# Patient Record
Sex: FEMALE | Race: WHITE
[De-identification: names, ages, dates, MRNs, and addresses within clinical notes are randomized per-mention and may not be internally consistent; named-entity substitution may affect disease eponyms.]

---

## 2017-10-31 ENCOUNTER — HOSPITAL ENCOUNTER (EMERGENCY)
Dept: HOSPITAL 92 - ERS | Age: 82
Discharge: HOME | End: 2017-10-31
Payer: MEDICARE

## 2017-10-31 DIAGNOSIS — E03.9: ICD-10-CM

## 2017-10-31 DIAGNOSIS — I10: ICD-10-CM

## 2017-10-31 DIAGNOSIS — Z79.899: ICD-10-CM

## 2017-10-31 DIAGNOSIS — E78.5: ICD-10-CM

## 2017-10-31 DIAGNOSIS — G30.9: ICD-10-CM

## 2017-10-31 DIAGNOSIS — S61.402A: Primary | ICD-10-CM

## 2017-10-31 DIAGNOSIS — M19.90: ICD-10-CM

## 2017-10-31 DIAGNOSIS — D64.9: ICD-10-CM

## 2017-10-31 DIAGNOSIS — F32.9: ICD-10-CM

## 2017-10-31 DIAGNOSIS — S00.81XA: ICD-10-CM

## 2017-10-31 DIAGNOSIS — I48.91: ICD-10-CM

## 2017-10-31 DIAGNOSIS — M25.551: ICD-10-CM

## 2017-10-31 DIAGNOSIS — F41.9: ICD-10-CM
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
## 2017-10-31 DIAGNOSIS — I25.10: ICD-10-CM

## 2017-10-31 DIAGNOSIS — Z86.73: ICD-10-CM

## 2017-10-31 PROCEDURE — 70450 CT HEAD/BRAIN W/O DYE: CPT

## 2017-10-31 PROCEDURE — 72170 X-RAY EXAM OF PELVIS: CPT

## 2017-10-31 NOTE — RAD
TWO VIEWS RIGHT HIP:

 

Date: 10-31-17 

 

History: Right hip pain after falling out of bed. 

 

FINDINGS: 

There is osteoarthritis involving the right hip. No obvious fracture is appreciated. There is no efren
dence of a dislocation. Degenerative change is seen in the pubic symphysis and lower lumbar spine. V
ascular calcifications and phleboliths overlie the pelvis. Moderate amount of retained fecal materia
l is seen in the region of the rectum. 

 

IMPRESSION: 

Osteoarthritis right hip. No obvious fracture is seen. If there is high clinical concern for fractur
e, MRI would be the more sensitive study of choice for evaluation of the radiographically occult rig
ht hip fracture. 

 

POS: [facility]

## 2017-10-31 NOTE — RAD
PORTABLE AP PELVIS:

 

Date: 10-31-17 

 

History: Fall from bed this morning, abrasions over left thigh and patient complains of right hip pa
in. 

 

FINDINGS: 

No obvious fracture is apparent. Patient is rotated. Degenerative changes seen in the lower lumbar s
pine. Phleboliths overlie the pelvis. Mild degenerative changes seen in the pubic symphysis. 

 

IMPRESSION: 

No obvious fracture involving either hip. There is minimal osteoarthritis. 

 

POS: [facility]

## 2017-10-31 NOTE — CT
NONCONTRAST HEAD CT:

 

Date: 10-31-17 

 

History: Patient fell this morning and hit head. Patient has Alzheimer's disease. Patient also compl
ains of right leg pain. 

 

Comparison: 9-5-17

 

FINDINGS: 

Again noted is confluent decreased attenuation of the periventricular white matter likely related to
 chronic small vessel ischemic changes. Areas of encephalomalacia are again seen in the bilateral oc
cipital regions suggesting remote areas of infraction unchanged from prior study. No acute cortical 
infarction is seen. There is no intraparenchymal or extraaxial hemorrhage. Mild cerebral volume loss
 is again present. There is no mass effect or midline shift. Ventricular system is normal in size, s
hape, and position for the degree of sulcal atrophy. 

 

No calvarial fracture is seen. There has been no interval change when compared to the prior study. 

 

IMPRESSION: 

1. No acute intracranial abnormalities demonstrated. 

2. Severe chronic small vessel ischemic changes. Superimposed acute white matter ischemic event woul
d be difficult to entirely exclude given the degree of white matter ischemic change. 

3. Remote infarcts, bilateral parietooccipital lobes. 

4. Mild cerebral volume loss. 

 

POS: [facility]

## 2017-12-24 ENCOUNTER — HOSPITAL ENCOUNTER (INPATIENT)
Dept: HOSPITAL 92 - ERS | Age: 82
LOS: 19 days | Discharge: NURSING, REHAB OR LONG-TERM CARE | DRG: 70 | End: 2018-01-12
Attending: HOSPITALIST | Admitting: HOSPITALIST
Payer: MEDICARE

## 2017-12-24 VITALS — BODY MASS INDEX: 19.5 KG/M2

## 2017-12-24 DIAGNOSIS — R25.1: ICD-10-CM

## 2017-12-24 DIAGNOSIS — E78.5: ICD-10-CM

## 2017-12-24 DIAGNOSIS — F02.80: ICD-10-CM

## 2017-12-24 DIAGNOSIS — G30.9: ICD-10-CM

## 2017-12-24 DIAGNOSIS — I48.0: ICD-10-CM

## 2017-12-24 DIAGNOSIS — R62.7: ICD-10-CM

## 2017-12-24 DIAGNOSIS — Z90.710: ICD-10-CM

## 2017-12-24 DIAGNOSIS — E87.0: ICD-10-CM

## 2017-12-24 DIAGNOSIS — G93.41: Primary | ICD-10-CM

## 2017-12-24 DIAGNOSIS — E03.9: ICD-10-CM

## 2017-12-24 DIAGNOSIS — I21.4: ICD-10-CM

## 2017-12-24 DIAGNOSIS — I47.1: ICD-10-CM

## 2017-12-24 DIAGNOSIS — J18.9: ICD-10-CM

## 2017-12-24 DIAGNOSIS — N17.9: ICD-10-CM

## 2017-12-24 DIAGNOSIS — Z66: ICD-10-CM

## 2017-12-24 DIAGNOSIS — I10: ICD-10-CM

## 2017-12-24 DIAGNOSIS — E86.0: ICD-10-CM

## 2017-12-24 DIAGNOSIS — E87.6: ICD-10-CM

## 2017-12-24 DIAGNOSIS — I25.10: ICD-10-CM

## 2017-12-24 DIAGNOSIS — D64.9: ICD-10-CM

## 2017-12-24 DIAGNOSIS — E46: ICD-10-CM

## 2017-12-24 DIAGNOSIS — I48.91: ICD-10-CM

## 2017-12-24 DIAGNOSIS — Z79.01: ICD-10-CM

## 2017-12-24 DIAGNOSIS — L89.152: ICD-10-CM

## 2017-12-24 DIAGNOSIS — I69.391: ICD-10-CM

## 2017-12-24 DIAGNOSIS — R13.12: ICD-10-CM

## 2017-12-24 DIAGNOSIS — F41.8: ICD-10-CM

## 2017-12-24 DIAGNOSIS — E87.1: ICD-10-CM

## 2017-12-24 LAB
ALBUMIN SERPL BCG-MCNC: 3.1 G/DL (ref 3.4–4.8)
ALP SERPL-CCNC: 57 U/L (ref 40–150)
ALT SERPL W P-5'-P-CCNC: 81 U/L (ref 8–55)
ANION GAP SERPL CALC-SCNC: 17 MMOL/L (ref 10–20)
APAP SERPL-MCNC: (no result) MCG/ML (ref 10–30)
APTT PPP: 35.9 SEC (ref 22.9–36.1)
AST SERPL-CCNC: 79 U/L (ref 5–34)
BACTERIA UR QL AUTO: (no result) HPF
BASOPHILS # BLD AUTO: 0 THOU/UL (ref 0–0.2)
BASOPHILS NFR BLD AUTO: 0 % (ref 0–1)
BILIRUB SERPL-MCNC: 0.6 MG/DL (ref 0.2–1.2)
BUN SERPL-MCNC: 69 MG/DL (ref 9.8–20.1)
CALCIUM SERPL-MCNC: 8.5 MG/DL (ref 7.8–10.44)
CHLORIDE SERPL-SCNC: 119 MMOL/L (ref 98–107)
CK MB SERPL-MCNC: 10.8 NG/ML (ref 0–6.6)
CK SERPL-CCNC: 433 U/L (ref 29–168)
CO2 SERPL-SCNC: 23 MMOL/L (ref 23–31)
CREAT CL PREDICTED SERPL C-G-VRATE: 0 ML/MIN (ref 70–130)
CRYSTAL-AUWI FLAG: 1.1 (ref 0–15)
EOSINOPHIL # BLD AUTO: 0.1 THOU/UL (ref 0–0.7)
EOSINOPHIL NFR BLD AUTO: 0.4 % (ref 0–10)
GLOBULIN SER CALC-MCNC: 3.3 G/DL (ref 2.4–3.5)
GLUCOSE SERPL-MCNC: 96 MG/DL (ref 83–110)
HEV IGM SER QL: 0.8 (ref 0–7.99)
HGB BLD-MCNC: 14.9 G/DL (ref 12–16)
HYALINE CASTS #/AREA URNS LPF: (no result) LPF
INR PPP: 1.8
LIPASE SERPL-CCNC: 20 U/L (ref 8–78)
LYMPHOCYTES # BLD: 1.3 THOU/UL (ref 1.2–3.4)
LYMPHOCYTES NFR BLD AUTO: 10.7 % (ref 21–51)
MACROCYTES BLD QL SMEAR: (no result) (100X)
MANUAL MICROSCOPIC REVIEWED?: (no result)
MCH RBC QN AUTO: 34.5 PG (ref 27–31)
MCV RBC AUTO: 105 FL (ref 81–99)
MDIFF COMPLETE?: YES
MONOCYTES # BLD AUTO: 0.3 THOU/UL (ref 0.11–0.59)
MONOCYTES NFR BLD AUTO: 2.6 % (ref 0–10)
NEUTROPHILS # BLD AUTO: 10.6 THOU/UL (ref 1.4–6.5)
NEUTROPHILS NFR BLD AUTO: 86.2 % (ref 42–75)
PATHC CAST-AUWI FLAG: 3.56 (ref 0–2.49)
PLATELET # BLD AUTO: 103 THOU/UL (ref 130–400)
PLATELET BLD QL SMEAR: (no result)
POTASSIUM SERPL-SCNC: 3 MMOL/L (ref 3.5–5.1)
PROTHROMBIN TIME: 21.4 SEC (ref 12–14.7)
RBC # BLD AUTO: 4.31 MILL/UL (ref 4.2–5.4)
RBC UR QL AUTO: (no result) HPF (ref 0–3)
SALICYLATES SERPL-MCNC: (no result) MG/DL (ref 15–30)
SODIUM SERPL-SCNC: 156 MMOL/L (ref 136–145)
SP GR UR STRIP: 1.02 (ref 1–1.04)
SPERM-AUWI FLAG: 0 (ref 0–9.9)
TROPONIN I SERPL DL<=0.01 NG/ML-MCNC: 5.71 NG/ML (ref ?–0.03)
TROPONIN I SERPL DL<=0.01 NG/ML-MCNC: 6.83 NG/ML (ref ?–0.03)
WBC # BLD AUTO: 12.3 THOU/UL (ref 4.8–10.8)
WBC UR QL AUTO: (no result) HPF (ref 0–3)
YEAST-AUWI FLAG: 160.5 (ref 0–25)

## 2017-12-24 PROCEDURE — 51702 INSERT TEMP BLADDER CATH: CPT

## 2017-12-24 PROCEDURE — 87086 URINE CULTURE/COLONY COUNT: CPT

## 2017-12-24 PROCEDURE — 80048 BASIC METABOLIC PNL TOTAL CA: CPT

## 2017-12-24 PROCEDURE — 85730 THROMBOPLASTIN TIME PARTIAL: CPT

## 2017-12-24 PROCEDURE — 87081 CULTURE SCREEN ONLY: CPT

## 2017-12-24 PROCEDURE — 84100 ASSAY OF PHOSPHORUS: CPT

## 2017-12-24 PROCEDURE — 85610 PROTHROMBIN TIME: CPT

## 2017-12-24 PROCEDURE — 85014 HEMATOCRIT: CPT

## 2017-12-24 PROCEDURE — A4216 STERILE WATER/SALINE, 10 ML: HCPCS

## 2017-12-24 PROCEDURE — 81001 URINALYSIS AUTO W/SCOPE: CPT

## 2017-12-24 PROCEDURE — 83605 ASSAY OF LACTIC ACID: CPT

## 2017-12-24 PROCEDURE — 96366 THER/PROPH/DIAG IV INF ADDON: CPT

## 2017-12-24 PROCEDURE — 80202 ASSAY OF VANCOMYCIN: CPT

## 2017-12-24 PROCEDURE — 96361 HYDRATE IV INFUSION ADD-ON: CPT

## 2017-12-24 PROCEDURE — 70551 MRI BRAIN STEM W/O DYE: CPT

## 2017-12-24 PROCEDURE — 96365 THER/PROPH/DIAG IV INF INIT: CPT

## 2017-12-24 PROCEDURE — 80307 DRUG TEST PRSMV CHEM ANLYZR: CPT

## 2017-12-24 PROCEDURE — 87430 STREP A AG IA: CPT

## 2017-12-24 PROCEDURE — 85018 HEMOGLOBIN: CPT

## 2017-12-24 PROCEDURE — 84484 ASSAY OF TROPONIN QUANT: CPT

## 2017-12-24 PROCEDURE — 85007 BL SMEAR W/DIFF WBC COUNT: CPT

## 2017-12-24 PROCEDURE — 82553 CREATINE MB FRACTION: CPT

## 2017-12-24 PROCEDURE — 71045 X-RAY EXAM CHEST 1 VIEW: CPT

## 2017-12-24 PROCEDURE — 36415 COLL VENOUS BLD VENIPUNCTURE: CPT

## 2017-12-24 PROCEDURE — 96367 TX/PROPH/DG ADDL SEQ IV INF: CPT

## 2017-12-24 PROCEDURE — 95816 EEG AWAKE AND DROWSY: CPT

## 2017-12-24 PROCEDURE — 84439 ASSAY OF FREE THYROXINE: CPT

## 2017-12-24 PROCEDURE — 85049 AUTOMATED PLATELET COUNT: CPT

## 2017-12-24 PROCEDURE — 82565 ASSAY OF CREATININE: CPT

## 2017-12-24 PROCEDURE — 96375 TX/PRO/DX INJ NEW DRUG ADDON: CPT

## 2017-12-24 PROCEDURE — 85027 COMPLETE CBC AUTOMATED: CPT

## 2017-12-24 PROCEDURE — 83690 ASSAY OF LIPASE: CPT

## 2017-12-24 PROCEDURE — 87040 BLOOD CULTURE FOR BACTERIA: CPT

## 2017-12-24 PROCEDURE — 70450 CT HEAD/BRAIN W/O DYE: CPT

## 2017-12-24 PROCEDURE — 93970 EXTREMITY STUDY: CPT

## 2017-12-24 PROCEDURE — 81003 URINALYSIS AUTO W/O SCOPE: CPT

## 2017-12-24 PROCEDURE — 80053 COMPREHEN METABOLIC PANEL: CPT

## 2017-12-24 PROCEDURE — 84443 ASSAY THYROID STIM HORMONE: CPT

## 2017-12-24 PROCEDURE — 81015 MICROSCOPIC EXAM OF URINE: CPT

## 2017-12-24 PROCEDURE — 85025 COMPLETE CBC W/AUTO DIFF WBC: CPT

## 2017-12-24 PROCEDURE — 71010: CPT

## 2017-12-24 PROCEDURE — 93306 TTE W/DOPPLER COMPLETE: CPT

## 2017-12-24 PROCEDURE — 95819 EEG AWAKE AND ASLEEP: CPT

## 2017-12-24 PROCEDURE — 76700 US EXAM ABDOM COMPLETE: CPT

## 2017-12-24 PROCEDURE — 93005 ELECTROCARDIOGRAM TRACING: CPT

## 2017-12-24 PROCEDURE — 94640 AIRWAY INHALATION TREATMENT: CPT

## 2017-12-24 PROCEDURE — 93010 ELECTROCARDIOGRAM REPORT: CPT

## 2017-12-24 PROCEDURE — 83735 ASSAY OF MAGNESIUM: CPT

## 2017-12-24 PROCEDURE — 84481 FREE ASSAY (FT-3): CPT

## 2017-12-24 PROCEDURE — 36416 COLLJ CAPILLARY BLOOD SPEC: CPT

## 2017-12-24 PROCEDURE — 36556 INSERT NON-TUNNEL CV CATH: CPT

## 2017-12-24 NOTE — RAD
UPRIGHT CHEST ONE VIEW:

 

History: 

87-year-old female with altered mental status. 

 

FINDINGS: 

Right central venous catheter has been placed, the tip of which extends down to the level of the righ
t innominate vein or upper superior vena cava. Heart size is within normal limits. There is severe ro
tation to the left.

 

IMPRESSION: 

Right jugular venous catheter with the tip extending down to the region of the right innominate vein/
upper superior vena cava. No pneumothorax or pleural effusion. 

 

POS: [facility]

## 2017-12-24 NOTE — RAD
PORTABLE SEMI UPRIGHT CHEST ONE VIEW:

 

History: 

87-year-old female with altered mental status, dehydration. 

 

Comparison: 

9-5-17

 

FINDINGS: 

There is rotation to the left. Heart size is within normal limits. The lungs are clear of acute proce
ss. No confluent pneumonia, overt edema, or pleural effusion. 

 

IMPRESSION: 

No acute intrathoracic disease. Rotation to the left. 

 

POS: [facility]

## 2017-12-24 NOTE — CT
BRAIN CT WITHOUT IV CONTRAST:

 

History:

87-year-old female with altered mental status. 

 

FINDINGS: 

There is marked atrophy and chronic white matter ischemic changes with some ventricular dilatation. O
ld right occipital encephalomalacia. No mass or midline shift. No acute hemorrhage. 

 

IMPRESSION: 

Stable head CT. No mass or bleed or other acute process. 

 

 

POS: [facility]

## 2017-12-24 NOTE — PDOC.EVN
Event Note





- Event Note


Event Note: 





[id number]


1. UTI


2. Sepsis


3. NSTEMI


4. GAMA + Hypernatremia


5. Altered mental status





plan:


see orders

## 2017-12-25 LAB
ALBUMIN SERPL BCG-MCNC: 2.9 G/DL (ref 3.4–4.8)
ALP SERPL-CCNC: 49 U/L (ref 40–150)
ALT SERPL W P-5'-P-CCNC: 69 U/L (ref 8–55)
ANION GAP SERPL CALC-SCNC: 12 MMOL/L (ref 10–20)
AST SERPL-CCNC: 74 U/L (ref 5–34)
BILIRUB SERPL-MCNC: 0.4 MG/DL (ref 0.2–1.2)
BUN SERPL-MCNC: 54 MG/DL (ref 9.8–20.1)
CALCIUM SERPL-MCNC: 8.3 MG/DL (ref 7.8–10.44)
CHLORIDE SERPL-SCNC: 122 MMOL/L (ref 98–107)
CO2 SERPL-SCNC: 25 MMOL/L (ref 23–31)
COMM CRITICAL RESULTS DOC: (no result)
CREAT CL PREDICTED SERPL C-G-VRATE: 26 ML/MIN (ref 70–130)
GLOBULIN SER CALC-MCNC: 2.7 G/DL (ref 2.4–3.5)
GLUCOSE SERPL-MCNC: 122 MG/DL (ref 83–110)
HGB BLD-MCNC: 13.3 G/DL (ref 12–16)
INR PPP: 1.7
MCH RBC QN AUTO: 33.9 PG (ref 27–31)
MCV RBC AUTO: 105 FL (ref 81–99)
MDIFF COMPLETE?: YES
PLATELET # BLD AUTO: 119 THOU/UL (ref 130–400)
POTASSIUM SERPL-SCNC: 2.1 MMOL/L (ref 3.5–5.1)
PROTHROMBIN TIME: 20.4 SEC (ref 12–14.7)
RBC # BLD AUTO: 3.92 MILL/UL (ref 4.2–5.4)
SODIUM SERPL-SCNC: 157 MMOL/L (ref 136–145)
TROPONIN I SERPL DL<=0.01 NG/ML-MCNC: 5.86 NG/ML (ref ?–0.03)
TROPONIN I SERPL DL<=0.01 NG/ML-MCNC: 7.32 NG/ML (ref ?–0.03)
WBC # BLD AUTO: 8.6 THOU/UL (ref 4.8–10.8)

## 2017-12-25 RX ADMIN — CEFTRIAXONE SCH MLS: 1 INJECTION, POWDER, FOR SOLUTION INTRAMUSCULAR; INTRAVENOUS at 09:57

## 2017-12-25 NOTE — HP
DATE OF ADMISSION:  02/24/2017

 

CHIEF COMPLAINT:  Altered mental status.

 

HISTORY OF PRESENT ILLNESS:  The patient is an 87-year-old female with past 
medical history of CVA, hypertension, hypothyroidism, depression, atrial 
fibrillation who came to the ER because of confusion and lethargy.  History 
obtained from the patient's daughter who is a pediatrician.  According to the 
daughter, today when she went to check on the patient, the patient was lethargic
, not able to wake up, did not answer any questions, so the patient was brought 
to the ER.  Per daughter, the patient is not eating and drinking well for the 
past few weeks.  The patient is currently living in the nursing home also with 
her .

 

SOCIAL HISTORY:  No smoking, no alcohol, no drugs.

 

FAMILY HISTORY:  Reviewed.

 

PAST SURGICAL HISTORY:  Hysterectomy.

 

PAST MEDICAL HISTORY:  As per HPI.

 

REVIEW OF SYSTEMS:  None available from the patient.

 

PHYSICAL EXAMINATION:  

CONSTITUTIONAL/VITAL SIGNS:  At the time of H&P performed, blood pressure 126/60
, heart rate 150, respiratory rate 18.  Face positive for low Ventimask and 
positive for 92%.

GENERAL:  The patient appears tired.  

HEENT:  Poor dentition.  Tongue appears dry.

NECK:  Supple.  No JVD.

CARDIOVASCULAR SYSTEM:  S1, S2 present, tachycardic.  No murmurs, no rubs, no 
gallops.

RESPIRATORY SYSTEM:  Positive for rhonchi.  Positive for crackles.

GASTROINTESTINAL:  Abdomen is soft, nontender, no guarding, no organomegaly, no 
masses felt.

MUSCULOSKELETAL:  No edema.

NEUROLOGIC:  Cranial nerve system not able to arouse, lethargy. 

PSYCHIATRIC:  Mood calm at this time.

 

LABORATORY DATA:  At the time of H&P performed; white count 12.3, hemoglobin 
14.9, platelet count is 103.   PT 21.4, INR 1.8.  BMP shows sodium 156, 
potassium 3, chloride 119, CO2 23, BUN 16, creatinine 1.56.  Lactic acid 3.1, 
troponin 5.7, CK-MB 10.8, serum total protein 6.4, TSH 1.27.  UA positive for 
nitrites, moderate leukoesterase, 21-50 WBCs.

 

IMAGING:  CT head; stable head CT.  Chest x-ray, no acute intrathoracic disease 
seen.

 

ASSESSMENT AND PLAN:  The patient is an 87-year-old female.

1.  Urinary tract infection.  Plan to start the patient on broad spectrum 
antibiotics.  Plan to monitor the patient closely.  Plan to admit the patient 
to the ICU.

2.  Sepsis secondary to urinary tract infection.  Continue IV antibiotics.  
Plan to do blood cultures and urine culture and sensitivity and we will monitor 
the patient.

3.  Acute kidney injury.  Baseline of creatinine unknown, but AKA appears due 
to sepsis.  Gentle IV fluids, repeat BMP in a.m. If creatinine worsens, we will 
consult Nephrology.

4.  Hyponatremia.  Plan to start patient on half normal saline and we will 
monitor the sodium level closely.  If sodium worsens will start the patient on 
D5W also and we will monitor sodium closely.

5.  Non-ST-segment elevation myocardial infarction plus supraventricular 
tachycardia.  The patient is having episode of supraventricular tachycardia in 
the ER.  We will go ahead and consult Cardiology to see the patient.  We will 
check serial cardiac enzymes and start patient on heparin drip.  We will check 
2D echo also.

6.  History of atrial fibrillation plus supraventricular tachycardia.  Plan to 
start patient on Cardizem drip, monitor heart rate closely.

7.  History of hypothyroidism.  Continue home medications.

 

The case was discussed in detail with the patient and patient's the daughter 
also.  

 

CODE STATUS:  The patient is full code at this time.

 

MTDD

## 2017-12-25 NOTE — CON
DATE OF CONSULTATION:  12/25/2017

 

HISTORY:  Yesi Cagle is an 87-year-old white female that I have seen in 
the past who was admitted with confusion and lethargy.  Her cardiac history 
began in 12/2006 when she was admitted with substernal chest discomfort, 
palpitations and a feeling of her heart flipping.  Her daughter is a 
pediatrician, checked her pulse, it was irregular and that continued the case 
throughout the day and she was brought to the emergency room.  She was found to 
be in atrial fibrillation and then converted back to sinus rhythm.  She was 
placed on Lovenox and then Coumadin.  Echo at that time revealed ejection 
fraction of 60%-65% with trivial mitral regurgitation, but otherwise 
unremarkable.

 

In 07/2012, she underwent cardiac catheterization and apparently had small 
vessel coronary artery disease and was placed on aspirin and Plavix.  She has 
continued to intermittently have chest pressure associated with weakness.  This 
would last approximately 15 minutes at a time and she felt she was in atrial 
fibrillation with that. In general, she was having those episodes every 3 
months.

 

She was admitted in 07/2015, which is when I initially saw her.  She apparently 
was having a headache and slumped over.  Head CT revealed multiple previous old 
infarctions in the right posterior parietal and left occipital lobes.  She is 
also noted to have an acute right occipital lobe infarction.  CTA revealed 
infarct in the distribution of the right posterior cerebral artery with 
occlusion of the midportion of the right posterior cerebral artery.  She was 
given tPA in the emergency room and had some improvement in her left arm 
weakness and continued to have visual problems.  She also had one episode 
approximately 9 seconds of paroxysmal atrial fibrillation.  She was placed on 
Eliquis 5 mg b.i.d.; however, had significant bruising so it was reduced to 2.5 
mg b.i.d.  Ejection fraction was normal on echo.  She also was placed on 
Multaq.  She never returned to my office for followup.

 

The patient cannot provide any history.  Apparently, she was admitted 
yesterday.  When her daughter noticed that she was increasingly lethargic, 
would not wake up or answer questions.  She also has not been eating or 
drinking well for the past few weeks.

 

PAST MEDICAL HISTORY:  Paroxysmal atrial fibrillation, hypertension, 
hypothyroidism, small vessel coronary artery disease maintained in the past on 
aspirin and Plavix, history of thyroid nodule, and hypercholesterolemia.

 

OPERATIONS:  Hemorrhoidectomy, hysterectomy, and right knee replacement in 06/
2013.

 

MEDICATIONS:  At the nursing home include acetaminophen p.r.n., Eliquis 2.5 
b.i.d., Citracal daily, vitamin B12 1000 mcg daily, Multaq 400 mg b.i.d., 
Lexapro 10 mg daily, ferrous gluconate 324 daily, folic acid 1 mg daily, 
levothyroxine 25 mcg daily, Reglan 5 mg t.i.d., Toprol-XL 25 mg daily, 
multivitamin, Protonix 40 daily, pravastatin 40 q.p.m.

 

ALLERGIES:  CIPROFLOXACIN and CLINDAMYCIN.

 

SOCIAL HISTORY:  She does not smoke or drink.

 

REVIEW OF SYSTEMS:  Unobtainable.

 

PHYSICAL EXAMINATION:

VITAL SIGNS:  146/69, pulse 77, normal sinus rhythm on the monitor.

HEENT:  PERRL.

NECK:  Supple.

CHEST:  Clear.

CARDIAC:  On examination, S1 and S2 are normal, without any S3, S4 or murmurs.  
Carotid upstrokes normal without bruits.

ABDOMEN:  Normal bowel sounds without tenderness or organomegaly.

EXTREMITIES:  Revealed no clubbing, cyanosis or edema.

NEUROLOGIC:  Patient is unresponsive.

SKIN:  Warm and dry.

 

IMAGING DATA AND LABORATORY DATA:  EKG on admission revealed atrial 
fibrillation with rate of 109 per minute with nonspecific ST segment changes 
and significant baseline artifact.  Hemoglobin 13.3, hematocrit 41.2, white 
count 8,600, platelets 119,000.  INR 1.7 on admission.  BMP revealed sodium 156
, potassium 3.6, chloride 119, carbon dioxide 23, BUN 69, creatinine 1.56.  
Today, sodium is 157, potassium 2.1, chloride 122, carbon dioxide 25, BUN 54, 
creatinine 1.13, , CK-MB 10.8, troponin I increased to 7.321.  TSH is 
0.2797 which is low.  Urinalysis revealed small blood, positive nitrite, 
moderate leukocyte esterase, RBCs 0-3, WBC 21-50, 4+ bacteria.  Blood cultures 
are negative x2.  I am uncertain if the urine culture was done.  There is no 
notation of that in the computer.

 

IMPRESSION:

1.  Lethargy and unresponsiveness secondary to multiple etiologies.

2.  Dehydration with hyponatremia and hypokalemia.  This has improved somewhat 
with intravenous hydration.

3.  Paroxysmal atrial fibrillation with episode on admission which has 
converted to sinus rhythm.  She continues to be maintained on Cardizem drip.

4.  Small vessel coronary artery disease on catheterization in 2012, presumably 
at the [facility].

5.  Non-ST elevation myocardial infarction.

6.  Urinary tract infection, possible urosepsis.

7.  Hypertension.

8.  Hypercholesterolemia.

9.  Hypothyroidism.

10.  Depression.

11.  Chronic anemia.

12.  History of cerebrovascular accident.

 

PLAN:  The patient will be maintained on Multaq and intravenous Cardizem drip 
at this time.  With her current mental status, I doubt she can take her p.o. 
Multaq or Eliquis.  Since she cannot take Eliquis, we will start her on Lovenox 
1 mg/kg b.i.d.  I will follow the patient with you.

 

DAVID

## 2017-12-25 NOTE — PDOC.PN
- Subjective


Encounter Start Date: 12/25/17


Encounter Start Time: 09:00


Subjective: nonalert





- Objective


MAR Reviewed: Yes


Vital Signs & Weight: 


 Vital Signs (12 hours)











  Temp Pulse Resp BP Pulse Ox


 


 12/25/17 07:35  99.3 F  77  36 H   95


 


 12/25/17 07:19  99.3 F  77  36 H  151/64 H  94 L


 


 12/25/17 06:00     151/71 H 


 


 12/25/17 05:00     138/54 L 


 


 12/25/17 04:00  100.0 F H  77  24 H  137/58 L  93 L


 


 12/25/17 03:00     131/52 L 


 


 12/25/17 02:01   85   143/52 H 


 


 12/25/17 01:08  98.9 F  83  24 H  137/49 L  95








 Weight











Weight                         103 lb 3.2 oz














I&O: 


 











 12/24/17 12/25/17 12/26/17





 06:59 06:59 06:59


 


Intake Total  315.2 


 


Output Total  400 


 


Balance  -84.8 











Result Diagrams: 


 12/25/17 05:27





 12/25/17 05:27





Phys Exam





- Physical Examination


mouth dry


Neck: no JVD


Respiratory: clear to auscultation bilateral


Cardiovascular: RRR, no significant murmur


Gastrointestinal: soft, non-tender, positive bowel sounds


Musculoskeletal: no edema


Deviation from normal: poor turgor





Dx/Plan


(1) Dehydration


Code(s): E86.0 - DEHYDRATION   Status: Acute   





(2) Hypernatremia


Code(s): E87.0 - HYPEROSMOLALITY AND HYPERNATREMIA   Status: Acute   





(3) Hypokalemia


Code(s): E87.6 - HYPOKALEMIA   Status: Acute   





(4) Alzheimer disease


Code(s): G30.9 - ALZHEIMER'S DISEASE, UNSPECIFIED   Status: Acute   





(5) Dyslipidemia


Code(s): E78.5 - HYPERLIPIDEMIA, UNSPECIFIED   Status: Chronic   





(6) Hypertension


Code(s): I10 - ESSENTIAL (PRIMARY) HYPERTENSION   Status: Chronic   





(7) Hypothyroidism


Code(s): E03.9 - HYPOTHYROIDISM, UNSPECIFIED   Status: Chronic   





(8) Atrial fibrillation with RVR


Code(s): I48.91 - UNSPECIFIED ATRIAL FIBRILLATION   Status: Resolved   





- Plan


severe dehydration with prerenal azotemia, hypernatremiaa, hypokalemia


-: D5 + 40 KCl at 125 ml/hr , monitor BMP


-: deescalate iv antibx to rocephin





* .

## 2017-12-26 LAB
ANION GAP SERPL CALC-SCNC: 10 MMOL/L (ref 10–20)
BUN SERPL-MCNC: 20 MG/DL (ref 9.8–20.1)
CALCIUM SERPL-MCNC: 8.2 MG/DL (ref 7.8–10.44)
CHLORIDE SERPL-SCNC: 108 MMOL/L (ref 98–107)
CO2 SERPL-SCNC: 30 MMOL/L (ref 23–31)
CREAT CL PREDICTED SERPL C-G-VRATE: 39 ML/MIN (ref 70–130)
GLUCOSE SERPL-MCNC: 142 MG/DL (ref 83–110)
POTASSIUM SERPL-SCNC: 2.5 MMOL/L (ref 3.5–5.1)
SODIUM SERPL-SCNC: 145 MMOL/L (ref 136–145)

## 2017-12-26 RX ADMIN — POTASSIUM CHLORIDE SCH MLS: 29.8 INJECTION, SOLUTION INTRAVENOUS at 06:38

## 2017-12-26 RX ADMIN — POTASSIUM CHLORIDE SCH MLS: 29.8 INJECTION, SOLUTION INTRAVENOUS at 13:05

## 2017-12-26 RX ADMIN — CEFTRIAXONE SCH MLS: 1 INJECTION, POWDER, FOR SOLUTION INTRAMUSCULAR; INTRAVENOUS at 09:25

## 2017-12-26 NOTE — CON
DATE OF CONSULTATION:  12/26/2017

 

HISTORY:  She is an 87-year-old female who in the [facility].  She is clearly encephalopathic, not able to 
give any history.  I spoke to her daughter whose name is Sandra, she states that the patient is a f
ull code.  She sees Dr. Leonides Harrison.  She has been in and out of this institution several times.  

 

What brought her into the hospital here because she was encephalopathic.  She was admitted.  She was 
started on broad-spectrum antibiotics, Rocephin.  Her electrolytes were abnormal.  They are much impr
kaia.  

 

Her sodium was 157 yesterday, it is 145 today.  Potassium is 2.5.  

 

Her renal functions are normal.  Troponin is elevated at 5.82.  

 

The daughter states that the patient has not been walking for a period of time, in fact, she has been
 in a wheelchair.  Over the last several months she has been spoon fed because of tremor.  It is felt
 by her primary care physician that Reglan could be causing Parkinson's-like problem.  

 

PAST MEDICAL HISTORY:  Cerebrovascular accident, hypertension, hypothyroidism, depression, atrial fib
rillation, tremor. 

 

PAST SURGICAL HISTORY:  Hysterectomy.  

 

SOCIAL HISTORY:  Smoking history, otherwise unremarkable.  No alcohol or tobacco abuse.  

 

REVIEW OF SYSTEMS:  Unobtainable.  

 

MEDICATIONS FROM NURSING HOME:  Pravachol 40, Protonix 40, Toprol-XL 25, Reglan 5 three times a day, 
Synthroid 25, folic acid, Lexapro 10, Multaq 400, Eliquis 2.5.  

 

PHYSICAL EXAMINATION: 

VITAL SIGNS:  Sats are 90 on 4 liters, respirations 28, temperature 98, blood pressure 130/68.  

CHEST:  Chest revealed decreased breath sounds, no wheezing. 

CARDIOVASCULAR:  Normal S1, S2. 

ABDOMEN:  Soft, no masses. 

 

LABORATORY:  White count 2.5.  X-ray shows a questionable left-sided infiltrate. 

 

IMPRESSION:  

1.  Metabolic encephalopathy.  

2.  Electrolyte imbalance, improved. 

3.  Chronic tremor. 

4.  Chronic arrhythmias.

5.  Atrial fibrillation.  

6.  Advanced age.  

 

PLAN:  As per the daughter, she is a full code.  Pulmonary has nothing additional to offer.  Continue
 antibiotics, supportive care.  

 

I will follow while she is in the [facility]. 

 

NOTE:  Fifty minutes of my time was spent at the bedside with direct patient care.

## 2017-12-26 NOTE — EKG
Test Reason : 

Blood Pressure : ***/*** mmHG

Vent. Rate : 079 BPM     Atrial Rate : 079 BPM

   P-R Int : 092 ms          QRS Dur : 072 ms

    QT Int : 384 ms       P-R-T Axes : 065 012 247 degrees

   QTc Int : 440 ms

 

Sinus rhythm with short FL with Premature atrial complexes





Abnormal ECG

When compared with ECG of 24-DEC-2017 22:10, (Unconfirmed)

Previous ECG has undetermined rhythm, needs review

ST now depressed in Inferior leads

ST now depressed in Anterior leads

ST less depressed in Lateral leads

T wave inversion more evident in Anterior leads

Confirmed by DR. LIA WILKINSON ([id number]) on 12/26/2017 10:25:19 AM

 

Referred By:  CATRINA           Confirmed By:DR. LIA WILKINSON

## 2017-12-26 NOTE — PDOC.PN
- Subjective


Encounter Start Date: 12/26/17


Encounter Start Time: 09:24


Subjective: minimally responsive





- Objective


MAR Reviewed: Yes


Vital Signs & Weight: 


 Vital Signs (12 hours)











  Temp Pulse Resp BP Pulse Ox


 


 12/26/17 07:57  98.8 F  78  28 H   98


 


 12/26/17 07:00  98.3 F  78  24 H  137/68  99


 


 12/26/17 06:00   62   128/55 L 


 


 12/26/17 05:00   62   136/55 L 


 


 12/26/17 04:00  98.8 F  70  24 H  148/87 H  100


 


 12/26/17 03:00   74   148/75 H 


 


 12/26/17 02:00  100.3 F H  69  24 H  132/56 L 


 


 12/26/17 01:00   73   130/57 L 


 


 12/26/17 00:45  101.1 F H  76  20  137/62  92 L


 


 12/26/17 00:00   72  26 H  173/62 H 


 


 12/25/17 23:00   83  21 H  153/70 H 


 


 12/25/17 22:00   80  26 H  164/75 H  94 L








 Weight











Admit Weight                   101 lb 12.8 oz


 


Weight                         106 lb 4.8 oz














I&O: 


 











 12/25/17 12/26/17 12/27/17





 06:59 06:59 06:59


 


Intake Total 315.2 3040.4 


 


Output Total 400 2650 


 


Balance -84.8 390.4 











Result Diagrams: 


 12/25/17 05:27





 12/26/17 05:02





Phys Exam





- Physical Examination


Constitutional: NAD


rattles over larynx, trachea


Neck: no JVD


scattered rhonchi


Cardiovascular: irregular


Gastrointestinal: soft, positive bowel sounds


Musculoskeletal: no edema





Dx/Plan


(1) Dehydration


Code(s): E86.0 - DEHYDRATION   Status: Acute   





(2) Hypernatremia


Code(s): E87.0 - HYPEROSMOLALITY AND HYPERNATREMIA   Status: Resolved   





(3) Hypokalemia


Code(s): E87.6 - HYPOKALEMIA   Status: Acute   





(4) Alzheimer disease


Code(s): G30.9 - ALZHEIMER'S DISEASE, UNSPECIFIED   Status: Chronic   


Qualifiers: 


   Alzheimer's disease onset: unspecified onset 





(5) Dyslipidemia


Code(s): E78.5 - HYPERLIPIDEMIA, UNSPECIFIED   Status: Chronic   





(6) Hypertension


Code(s): I10 - ESSENTIAL (PRIMARY) HYPERTENSION   Status: Chronic   





(7) Hypothyroidism


Code(s): E03.9 - HYPOTHYROIDISM, UNSPECIFIED   Status: Chronic   


Qualifiers: 


   Hypothyroidism type: unspecified   Qualified Code(s): E03.9 - Hypothyroidism

, unspecified   





(8) Encephalopathy


Code(s): G93.40 - ENCEPHALOPATHY, UNSPECIFIED   Status: Acute   





(9) Atrial fibrillation with RVR


Code(s): I48.91 - UNSPECIFIED ATRIAL FIBRILLATION   Status: Resolved   





- Plan


Na, creat normalized. K+ still low, on iv replacement


-: no improvement in mental status


-: still oniv diltiazem for rate control





* .

## 2017-12-27 LAB
ANION GAP SERPL CALC-SCNC: 11 MMOL/L (ref 10–20)
BUN SERPL-MCNC: 12 MG/DL (ref 9.8–20.1)
CALCIUM SERPL-MCNC: 8.3 MG/DL (ref 7.8–10.44)
CHLORIDE SERPL-SCNC: 100 MMOL/L (ref 98–107)
CO2 SERPL-SCNC: 30 MMOL/L (ref 23–31)
CREAT CL PREDICTED SERPL C-G-VRATE: 48 ML/MIN (ref 70–130)
GLUCOSE SERPL-MCNC: 111 MG/DL (ref 83–110)
POTASSIUM SERPL-SCNC: 3.7 MMOL/L (ref 3.5–5.1)
SODIUM SERPL-SCNC: 137 MMOL/L (ref 136–145)

## 2017-12-27 RX ADMIN — CEFTRIAXONE SCH MLS: 1 INJECTION, POWDER, FOR SOLUTION INTRAMUSCULAR; INTRAVENOUS at 09:18

## 2017-12-27 NOTE — PDOC.PN
- Subjective


Encounter Start Date: 12/27/17


Encounter Start Time: 11:23


Subjective: nonresponsive





- Objective


MAR Reviewed: Yes


Vital Signs & Weight: 


 Vital Signs (12 hours)











  Temp Pulse Resp BP Pulse Ox


 


 12/27/17 07:35  99.5 F  86  20   96


 


 12/27/17 07:00  97.7 F  71  24 H  144/75 H  97


 


 12/27/17 06:00   86  20  127/70  97


 


 12/27/17 05:00   87  20  121/83  95


 


 12/27/17 04:00  99.5 F  85  22 H  150/86 H  98


 


 12/27/17 03:00   93  20  124/73 


 


 12/27/17 00:00  99.6 F  81  24 H  134/89  94 L








 Weight











Admit Weight                   101 lb 12.8 oz


 


Weight                         106 lb 9.6 oz














I&O: 


 











 12/26/17 12/27/17 12/28/17





 06:59 06:59 06:59


 


Intake Total 3040.4 3080 


 


Output Total 2650 2075 


 


Balance 390.4 1005 











Result Diagrams: 


 12/25/17 05:27





 12/27/17 04:15





Phys Exam





- Physical Examination


Constitutional: NAD


Neck: no JVD


Respiratory: clear to auscultation bilateral


Cardiovascular: irregular


Gastrointestinal: soft, non-tender, positive bowel sounds


Musculoskeletal: no edema





Dx/Plan


(1) Dehydration


Code(s): E86.0 - DEHYDRATION   Status: Resolved   





(2) Hypernatremia


Code(s): E87.0 - HYPEROSMOLALITY AND HYPERNATREMIA   Status: Resolved   





(3) Hypokalemia


Code(s): E87.6 - HYPOKALEMIA   Status: Resolved   





(4) Alzheimer disease


Code(s): G30.9 - ALZHEIMER'S DISEASE, UNSPECIFIED   Status: Chronic   


Qualifiers: 


   Alzheimer's disease onset: unspecified onset 





(5) Dyslipidemia


Code(s): E78.5 - HYPERLIPIDEMIA, UNSPECIFIED   Status: Chronic   





(6) Hypertension


Code(s): I10 - ESSENTIAL (PRIMARY) HYPERTENSION   Status: Chronic   





(7) Hypothyroidism


Code(s): E03.9 - HYPOTHYROIDISM, UNSPECIFIED   Status: Chronic   


Qualifiers: 


   Hypothyroidism type: unspecified   Qualified Code(s): E03.9 - Hypothyroidism

, unspecified   





(8) Encephalopathy


Code(s): G93.40 - ENCEPHALOPATHY, UNSPECIFIED   Status: Acute   





- Plan


no po intake, in discussion with family re plans





* .

## 2017-12-27 NOTE — PQF
CLINICAL DOCUMENTATION IMPROVEMENT CLARIFICATION FORM:  ICD-10 Updated



PLEASE DO AN ADDENDUM TO THE PROGRESS NOTE WITH ANY DOCUMENTATION UPDATES OR 
ADDITIONS AND CARRY THROUGH TO DC SUMMARY.   THANK YOU.



DATE:    12/27                                                                 
                    ATTN:  DR. FAUZIA BARAKAT



Please exercise your independent, professional judgment in responding to the 
clarification form. Clinical indicators are provided on the bottom of this form 
for your review



Please check appropriate box(s) to clarify if the following diagnosis has been 
ruled in our ruled out:



                                         SEPSIS 2/2 UTI



[  ] Ruled in diagnosis

     [  ] Continue to treat        [  ] Resolved

[ x ] Ruled out diagnosis

[  ] Other diagnosis ___________

[  ] Unable to determine



For continuity of documentation, please document condition throughout progress 
notes and discharge summary.  Thank You.



CLINICAL INDICATORS - SIGNS / SYMPTOMS / LABS



ER PRESENTATION 12/24:  AMS    RR:  21-25       HR:          T:  99.4     
WBC:  12.3     LACTIC ACID:  3.1

   

ER TREATMENT:  4L NS, IV VANCOMYCIN  CEFEPIME



ER PHYSICIAN DIAGNOSIS DOCUMENTATION:  SEPSIS



PHYSICIAN H&P DOCUMENTATION 12/24:  ASSESSMENT/PLAN:  2.  SEPSIS 2/2  UTI



NO FURTHER MENTION OF SEPSIS TO DATE



RISK FACTORS:

UTI

ADVANCED AGE (87)

AMS

ACUTE KIDNEY INJURY



TREATMENTS:

IMCU MONITORING

IV ANTIBIOTICS (VANCOMYCIN & MAXIPIME 12/24; MERREM 12/25; ROCEPHIN 12/25 - 
PRESENT)

IVF (NS 12/24; D5 W/20 MEQ KCL 12/25 - PRESENT)





THANK  YOU!  Elena



(This form is maintained as a part of the permanent medical record)

 2015 [employer], "[employer]".  All Rights Reserved

Elena Baxter RN, BSN    [email or web address]    Office:  [phone]

                                                              

[facility]

## 2017-12-27 NOTE — PRG
DATE OF SERVICE:  12/27/2017

 

SUBJECTIVE:  Yesi Cagle likely back to normal.  She still remains encephalopathic.  It is unclea
r whether she is able to swallow any food.

 

PHYSICAL EXAMINATION:

VITAL SIGNS:  Sats are 97% on 4 liters, respirations 20, temperature is 99, blood pressure 144/75.

CHEST:  Decreased breath sounds, no wheezing.

CARDIAC:  Normal S1, S2.  No gallops.

ABDOMEN:  Soft.  No masses.

 

LABORATORY DATA:  Sodium 137, potassium 3.3.

 

IMPRESSION:

1.  Encephalopathy.

2.  Pneumonia.

3.  Supraventricular tachycardia.

4.  Dementia.

 

PLAN:  Continue antibiotics.  Family to decide about her code status.  PT and nutrition.  One hour cr
itical care time.

## 2017-12-27 NOTE — PDOC.EVN
Event Note





- Event Note


Event Note: 





aer discussion with family, will get coinsult for EGD

## 2017-12-28 LAB
ANION GAP SERPL CALC-SCNC: 11 MMOL/L (ref 10–20)
BUN SERPL-MCNC: 8 MG/DL (ref 9.8–20.1)
CALCIUM SERPL-MCNC: 8.2 MG/DL (ref 7.8–10.44)
CHLORIDE SERPL-SCNC: 98 MMOL/L (ref 98–107)
CO2 SERPL-SCNC: 28 MMOL/L (ref 23–31)
CREAT CL PREDICTED SERPL C-G-VRATE: 48 ML/MIN (ref 70–130)
GLUCOSE SERPL-MCNC: 120 MG/DL (ref 83–110)
POTASSIUM SERPL-SCNC: 3.8 MMOL/L (ref 3.5–5.1)
SODIUM SERPL-SCNC: 133 MMOL/L (ref 136–145)

## 2017-12-28 PROCEDURE — 0DH68UZ INSERTION OF FEEDING DEVICE INTO STOMACH, VIA NATURAL OR ARTIFICIAL OPENING ENDOSCOPIC: ICD-10-PCS | Performed by: INTERNAL MEDICINE

## 2017-12-28 PROCEDURE — 3E0G76Z INTRODUCTION OF NUTRITIONAL SUBSTANCE INTO UPPER GI, VIA NATURAL OR ARTIFICIAL OPENING: ICD-10-PCS | Performed by: INTERNAL MEDICINE

## 2017-12-28 RX ADMIN — CEFTRIAXONE SCH MLS: 1 INJECTION, POWDER, FOR SOLUTION INTRAMUSCULAR; INTRAVENOUS at 10:47

## 2017-12-28 NOTE — PDOC.PN
- Subjective


Encounter Start Date: 12/28/17


Encounter Start Time: 10:26


-: non-verbal





- Objective


MAR Reviewed: Yes


Vital Signs & Weight: 


 Vital Signs (12 hours)











  Temp Pulse Resp BP BP Pulse Ox


 


 12/28/17 07:56  97.4 F L  74  18   124/61  100


 


 12/28/17 07:21  97.6 F  71  20    100


 


 12/28/17 07:18  97.6 F  71  20  124/61   100


 


 12/28/17 04:00  97.9 F  77  18   150/88 H  99


 


 12/28/17 03:00   77  18   146/80 H  99


 


 12/28/17 02:00   77  18   124/72  100


 


 12/28/17 00:00  98.2 F  80  18   128/74  100








 Weight











Admit Weight                   101 lb 12.8 oz


 


Weight                         107 lb 1.6 oz














I&O: 


 











 12/27/17 12/28/17 12/29/17





 06:59 06:59 06:59


 


Intake Total 3080 3170 


 


Output Total 2075 2100 


 


Balance 1005 1070 











Result Diagrams: 


 12/25/17 05:27





 12/28/17 04:15





Phys Exam





- Physical Examination


Neck: no JVD


Respiratory: clear to auscultation bilateral


Cardiovascular: irregular


Gastrointestinal: soft, non-tender, positive bowel sounds


Musculoskeletal: edema present





Dx/Plan


(1) Dehydration


Code(s): E86.0 - DEHYDRATION   Status: Resolved   





(2) Hypernatremia


Code(s): E87.0 - HYPEROSMOLALITY AND HYPERNATREMIA   Status: Resolved   





(3) Hypokalemia


Code(s): E87.6 - HYPOKALEMIA   Status: Resolved   





(4) Alzheimer disease


Code(s): G30.9 - ALZHEIMER'S DISEASE, UNSPECIFIED   Status: Chronic   


Qualifiers: 


   Alzheimer's disease onset: unspecified onset 





(5) Dyslipidemia


Code(s): E78.5 - HYPERLIPIDEMIA, UNSPECIFIED   Status: Chronic   





(6) Hypertension


Code(s): I10 - ESSENTIAL (PRIMARY) HYPERTENSION   Status: Chronic   





(7) Hypothyroidism


Code(s): E03.9 - HYPOTHYROIDISM, UNSPECIFIED   Status: Chronic   


Qualifiers: 


   Hypothyroidism type: unspecified   Qualified Code(s): E03.9 - Hypothyroidism

, unspecified   





(8) Encephalopathy


Code(s): G93.40 - ENCEPHALOPATHY, UNSPECIFIED   Status: Acute   





- Plan


PEG today,still on iv diltiazem fo rate control


-: will start nutrition post PEG





* .

## 2017-12-28 NOTE — OP
PREPROCEDURE DIAGNOSIS:

1.  Oropharyngeal dysphagia, multifactorial, probably related to prior stroke, Alzheimer's disease.

2.  Discussed with the patient's daughter as Dr. Tejada did initial consult last night that PEG tube w
ill not prevent aspiration with severe oropharyngeal dysphagia that she has and the options for palli
ative care were discussed and recommended.  This was refused and due to risks, benefits and possible 
complications of endoscopy and PEG tube placement including perforation, bleeding, infection, injury 
to intraabdominal organs were all explained.  It was also explained that the patient will have a comp
lication.  It could be life threatening in light of her advanced age and poor health.

 

ANESTHESIA:  TIVA with 2 grams of Ancef.

 

POSTPROCEDURE DIAGNOSIS:  Successful PEG tube placement by Ponsky pull technique.

 

PROCEDURE IN DETAIL:  After the patient was informed of the risks, benefits, possible complications t
milady, informed consent was obtained.  The patient was brought to the endoscopy suite where she was se
dated in gradual fashion.  Once she was comfortable, a bite block was placed in incisural orifice.  T
he endoscope was advanced through the esophagus, stomach and second and third portion of duodenum and
 slowly removed.  There was good visualization of mucosa.  There was adequate place for PEG tube plac
ement with finger indentation and transillumination.  The abdomen was prepped and draped in sterile f
ashion and a PEG tube was placed by Ponsky pull technique.  Second look confirmed good placement.  Th
e scope was removed.  The patient brought back to to the ICU in stable condition.

## 2017-12-28 NOTE — CON
DATE OF CONSULTATION:  12/27/2017

 

REASON FOR CONSULTATION:  Dysphagia, malnutrition.

 

CONSULTING PHYSICIAN:  Ran Rausch MD

 

HISTORY OF PRESENT ILLNESS:  Patient is an 87-year-old female with past medical 
history of atrial fibrillation with rapid ventricular response, hyperlipidemia, 
hypertension, hypothyroidism, anxiety/depression, Alzheimer disease, and CVA, 
who was initially admitted to the hospital with complaints of confusion and 
lethargy.  Shortly before admission, the patient was found by her daughter and 
was noted to be unresponsive to verbal or tactile stimulation, so the patient 
was subsequently brought to the ER.  Shortly after admission, she was noted to 
be in sepsis with a complicated UTI and placed on appropriate antibiotic 
therapy.  Since that time her confusion and lethargy have not improved with 
continued altered mental status at this time.



The remainder of information obtained was through chart review.  Per chart 
review, the patient had not been eating or drinking well for the past few weeks 
with the ability to tolerate spoon feeding by relatives only.  Since she has 
been here in the hospital, she has not been able to respond to simple commands 
nor has she had been able to demonstrate an adequate swallow, prompting GI 
consultation for possible PEG tube placement.

 

PAST MEDICAL HISTORY:  As per HPI.

 

PAST SURGICAL HISTORY:  Hysterectomy, hemorrhoidectomy, right knee replacement.

 

FAMILY HISTORY:  Reviewed.

 

SOCIAL HISTORY:  Denies any tobacco, alcohol, or drugs.

 

ALLERGIES:  CIPROFLOXACIN, CLINDAMYCIN.

 

REVIEW OF SYSTEMS:  Unable to be obtained from the patient due to altered 
mental status.

 

PHYSICAL EXAMINATION:

VITAL SIGNS:  Showing a temperature of 98.7, pulse 95, blood pressure 135/71, 
respiratory rate 21, satting 100% on 4 liters nasal cannula.

GENERAL:  Patient in no acute distress, unresponsive to verbal or tactile 
stimuli.

NECK:  Supple.  No JVD noted.

CARDIOVASCULAR:  Irregularly irregular rhythm with no discernible murmurs, 
gallops, or rubs.

RESPIRATORY:  Decreased breath sounds in all lung fields.  No discernible 
wheezes or rales.

ABDOMEN:  Normoactive bowel sounds, soft, nontender, nondistended.

EXTREMITIES:  No cyanosis, clubbing, or edema.

 

LABORATORY DATA:  CBC with a white blood cell count of 8.6, hemoglobin 13.3, 
hematocrit 41.2, platelet 119.  Chemistry with sodium of 137, potassium 3.7, 
chloride 100, carbon dioxide 30, BUN 12, creatinine 0.63, glucose 111, AST 74, 
ALT 69, alkaline phosphatase 49, total bilirubin 0.4.  Troponin 5.

 

IMAGING DATA:  CT head obtained on admission with no change, stable findings.  
Chest x-ray no acute intrathoracic disease.

 

ASSESSMENT AND PLAN:  The patient is an 87-year-old female with past medical 
history of atrial fibrillation with rapid ventricular rate, hyperlipidemia, 
hypertension, hypothyroidism, anxiety/depression, Alzheimer disease, and CVA 
presenting with urosepsis, Non-ST-elevation myocardial infarction, and dysphagia
/malnutrition.

 

Dysphagia/malnutrition

The patient presenting with a history of malnutrition with inability to 
tolerate oral feeds by herseld, but rather was only able to maintain nutrition 
when spoon fed at home.  While she has been in the hospital, she has been 
unable to follow simple commands, making further swallow studies or evaluation 
of her swallowing capacity difficult.  At this time, she is unable to 
adequately feed herself with concern for moderate-to-severe calorie 
malnutrition and may require PEG tube placement.  Placing feeding tubes in 
elderly dementia patients is controversial primarily due to observational 
studies showing no improvement in functional or nutritional status.  However, 
per the medical power of  (patient's daughter), she would like to 
pursue with EGD with PEG tube placement at this time.

 

RECOMMENDATIONS:

1.  Follow with primary inpatient team.

2.  Please make the patient n.p.o. at midnight for possible EGD with PEG tube 
placement on 12/28/2017.

3.  Please hold morning anticoagulation/Lovenox/DVT prophylaxis.

 

MTDD

## 2017-12-28 NOTE — PRG
DATE OF SERVICE:  12/28/2017

 

SUBJECTIVE:  Ms. Yesi Cagle remains encephalopathic, minimal response.

 

OBJECTIVE:

VITAL SIGNS:  Sats are 100% on 2 liters, temperature 97, labored respirations at 18, blood pressure 1
24/61.  I's and O's are 3170 in and 2100 out.

CHEST:  Chest reveals decreased breath sounds, no wheezing.

CARDIAC:  Normal S1, S2.  No gallops.

ABDOMEN:  Soft, no masses.

 

LABORATORY DATA:  Electrolytes are back to normal.  Sodium dropped to 133.

 

IMPRESSION:  Dysphagia, malnourishment.

 

PLAN:  Patient is having a PEG placed in today.  Overall prognosis is grave.  She is a full code stil
l, as per the family's wishes.

 

We will follow while in the MICU.

## 2017-12-29 LAB
ANION GAP SERPL CALC-SCNC: 12 MMOL/L (ref 10–20)
BUN SERPL-MCNC: 8 MG/DL (ref 9.8–20.1)
CALCIUM SERPL-MCNC: 8.3 MG/DL (ref 7.8–10.44)
CHLORIDE SERPL-SCNC: 99 MMOL/L (ref 98–107)
CO2 SERPL-SCNC: 27 MMOL/L (ref 23–31)
CREAT CL PREDICTED SERPL C-G-VRATE: 45 ML/MIN (ref 70–130)
GLUCOSE SERPL-MCNC: 127 MG/DL (ref 83–110)
HGB BLD-MCNC: 13.6 G/DL (ref 12–16)
MAGNESIUM SERPL-MCNC: 1 MG/DL (ref 1.6–2.6)
PLATELET # BLD AUTO: 153 THOU/UL (ref 130–400)
POTASSIUM SERPL-SCNC: 3.5 MMOL/L (ref 3.5–5.1)
SODIUM SERPL-SCNC: 134 MMOL/L (ref 136–145)

## 2017-12-29 RX ADMIN — CEFTRIAXONE SCH MLS: 1 INJECTION, POWDER, FOR SOLUTION INTRAMUSCULAR; INTRAVENOUS at 11:08

## 2017-12-29 NOTE — PRG
DATE OF SERVICE:  12/29/2017

 

SUBJECTIVE:  The patient is doing well overnight without any problems or 
complaints.  She is still unresponsive to both verbal and tactile stimuli.  
Further questioning not available due to inability to respond to commands.

 

PHYSICAL EXAMINATION:

VITAL SIGNS:  Temperature 98.7, pulse 85, blood pressure 135/72, respiratory 
rate 17, satting 100% on 1 liter nasal cannula.

GENERAL:  No acute distress, unable to respond to both verbal or tactile 
stimuli.

CARDIOVASCULAR:  Regular rate and rhythm with no discernible murmurs, gallops 
or rubs.

RESPIRATORY:  Coarse breath sounds in all lung fields with no discernible 
wheezes or rales.

ABDOMEN:  Normoactive bowel sounds, soft, nontender, nondistended.  PEG tube 
site in the left upper abdominal quadrant with no surrounding erythema, 
purulence, or skin breakdown.  The external bumper was noted at 2.5 cm and 
loosened up to 3.5 cm.

EXTREMITIES:  Cachectic in appearance.  No cyanosis, clubbing or edema.

 

ASSESSMENT:  Patient is an 87-year-old female with past medical history of 
atrial fibrillation with rapid ventricular response, hyperlipidemia, 
hypertension, hypothyroidism, anxiety/depression, Alzheimer disease, and 
cerebrovascular accident presenting with urosepsis, non-ST elevation myocardial 
infarction, and dysphagia/malnutrition.

 

Dysphagia/malnutrition

Patient initially presenting with a history of malnutrition with inability to 
tolerate oral feeds by herself, but rather was only able to maintain nutrition 
once at home.  She was initially admitted to the hospital for altered mental 
status which has persisted to this day.  Given her inability to tolerate oral 
food intake, a PEG tube was placed on 12/28/2017.  Currently, the PEG tube is 
without any signs of complication or skin breakdown.

 

RECOMMENDATIONS:

1.  Please follow PEG tube care per protocol.

2.  Would continue tube feeds and advance as needed per dietary consultation 
protocol.



We will sign off at this time.  Please call with any questions.

 

DAVID

## 2017-12-29 NOTE — PDOC.PN
- Subjective


Encounter Start Date: 12/29/17


Encounter Start Time: 18:08


Subjective: seen and examined no new complaint--Peg tube in place





- Objective


Vital Signs & Weight: 


 Vital Signs (12 hours)











  Temp Pulse Resp BP BP Pulse Ox


 


 12/29/17 17:31   95    142/83 H 


 


 12/29/17 16:00   84    132/77 


 


 12/29/17 15:38  97.2 F L  84  16   139/66  100


 


 12/29/17 14:00   85    135/72 


 


 12/29/17 13:00   84    153/69 H 


 


 12/29/17 12:00   81    137/61 


 


 12/29/17 11:46  98.7 F  85  17   133/65  100


 


 12/29/17 11:00   85    133/65 


 


 12/29/17 10:00   91    161/90 H 


 


 12/29/17 09:00   90    149/77 H 


 


 12/29/17 08:00   66    158/86 H 


 


 12/29/17 07:41  97.2 F L     


 


 12/29/17 07:22  98.6 F  85  20    100


 


 12/29/17 07:01   85  20  134/64   100








 Weight











Admit Weight                   101 lb 12.8 oz


 


Weight                         106 lb 2 oz














I&O: 


 











 12/28/17 12/29/17 12/30/17





 06:59 06:59 06:59


 


Intake Total 3170 1264 285


 


Output Total 2100 1075 


 


Balance 1070 189 285











Result Diagrams: 


 12/29/17 04:30





 12/29/17 04:30





Phys Exam





- Physical Examination


Constitutional: NAD


HEENT: PERRLA, moist MMs, sclera anicteric, TM's clear


Neck: no nodes, no JVD, supple, full ROM


Respiratory: no wheezing, no rales, no rhonchi, clear to auscultation bilateral


Cardiovascular: RRR, no significant murmur, no rub


Gastrointestinal: soft, non-tender, no distention, positive bowel sounds





Dx/Plan


(1) Encephalopathy


Code(s): G93.40 - ENCEPHALOPATHY, UNSPECIFIED   Status: Acute   





(2) Alzheimer disease


Code(s): G30.9 - ALZHEIMER'S DISEASE, UNSPECIFIED   Status: Chronic   


Qualifiers: 


   Alzheimer's disease onset: unspecified onset 





(3) Dehydration


Code(s): E86.0 - DEHYDRATION   Status: Resolved   





(4) Hypernatremia


Code(s): E87.0 - HYPEROSMOLALITY AND HYPERNATREMIA   Status: Resolved   





(5) Hypokalemia


Code(s): E87.6 - HYPOKALEMIA   Status: Resolved   





(6) Anxiety and depression


Code(s): F41.8 - OTHER SPECIFIED ANXIETY DISORDERS   Status: Chronic   





- Plan


plan discussed w/ family, PT/OT, 


Start po meds and wean off cardizem gtt


-: Dispo planning





* .

## 2017-12-29 NOTE — PRG
DATE OF SERVICE:  12/29/2017

 

SUBJECTIVE:  The patient is doing reasonably well, still on Cardizem drip.

 

PHYSICAL EXAMINATION:

VITAL SIGNS:  Temperature 98.7, pulse 85, respiration rate 17, O2 sat 100%, blood pressure 133/65.

HEENT:  Unremarkable.

NECK:  No JVD.

CARDIAC:  S1 and S2 regular.

ABDOMEN:  Soft.

EXTREMITIES:  No edema.

 

ASSESSMENT:

1.  Dysphagia and malnourishment.

2.  Paroxysmal atrial fibrillation.

 

PLAN:  She can transfer out to the telemetry floor, continue on the Cardizem drip.  She is not in nee
d of [facility] care at this time.

## 2017-12-30 RX ADMIN — CEFTRIAXONE SCH MLS: 1 INJECTION, POWDER, FOR SOLUTION INTRAMUSCULAR; INTRAVENOUS at 10:15

## 2017-12-30 NOTE — PDOC.PN
- Subjective


Encounter Start Date: 12/30/17


Encounter Start Time: 13:45





Patient seen and examined. No new complaints. No overnight events.


pt non responsive.


talked with family over the phone.





- Objective


MAR Reviewed: Yes


Vital Signs & Weight: 


 Vital Signs (12 hours)











  Temp Pulse Resp BP BP Pulse Ox


 


 12/30/17 11:52  99.0 F  76  22 H   141/66 H  100


 


 12/30/17 08:00  97.6 F  70  22 H    93 L


 


 12/30/17 07:46  97.6 F  70  22 H   148/68 H  100


 


 12/30/17 06:07  98.4 F  80  18  160/86 H   100


 


 12/30/17 04:33  100.3 F H  84  18  124/58 L   100


 


 12/30/17 02:28   77  20  131/55 L   100








 Weight











Admit Weight                   101 lb 12.8 oz


 


Weight                         109 lb 1 oz














I&O: 


 











 12/29/17 12/30/17 12/31/17





 06:59 06:59 06:59


 


Intake Total 1264 2107.3 50


 


Output Total 1075 775 


 


Balance 189 1332.3 50











Result Diagrams: 


 12/29/17 04:30





 12/29/17 04:30





Phys Exam





- Physical Examination


non responsive


HEENT: sclera anicteric


Respiratory: no wheezing, no rales


Cardiovascular: irregular


Gastrointestinal: soft


Musculoskeletal: no edema


non responsive


Skin: no rash





Dx/Plan


(1) Encephalopathy


Code(s): G93.40 - ENCEPHALOPATHY, UNSPECIFIED   Status: Acute   





(2) Hypernatremia


Code(s): E87.0 - HYPEROSMOLALITY AND HYPERNATREMIA   Status: Resolved   





(3) Hypokalemia


Code(s): E87.6 - HYPOKALEMIA   Status: Resolved   





(4) Syncope


Code(s): R55 - SYNCOPE AND COLLAPSE   Status: Acute   





(5) Anxiety and depression


Code(s): F41.8 - OTHER SPECIFIED ANXIETY DISORDERS   Status: Chronic   





(6) Hypothyroidism


Code(s): E03.9 - HYPOTHYROIDISM, UNSPECIFIED   Status: Chronic   


Qualifiers: 


   Hypothyroidism type: unspecified   Qualified Code(s): E03.9 - Hypothyroidism

, unspecified   





(7) Physical deconditioning


Code(s): R53.81 - OTHER MALAISE   Status: Chronic   





- Plan


cont current plan of care, plan discussed w/ family, continue antibiotics, PT/OT

, 





* .


continue abx


continue tube feeds if tolerated.


CM consult for DC planning.


? neuro consult


AM labs. 


replete Mg and K as tolerated.

## 2017-12-30 NOTE — PRG
DATE OF SERVICE:  12/30/2017

 

The patient seems to be about the same.  She does not communicate with me.

 

PHYSICAL EXAMINATION:

VITAL SIGNS:  On exam, her temperature is 97.6, pulse 71, respirations 22, O2 sat 93%, blood pressure
 140/68.

HEENT:  Unremarkable.

NECK:  No JVD.

LUNGS:  Poor air movement.

CARDIOVASCULAR:  S1 and S2 regular.

ABDOMEN:  Soft.  PEG tube looks okay.

EXTREMITIES:  No edema.

 

ASSESSMENT:

1.  Generalized failure to thrive.

2.  Dysphagia and malnourishment.

3.  Paroxysmal atrial fibrillation.

 

PLAN:  I would assume this patient could probably be transferred to medical.  I do not have any furth
er to add from a pulmonary standpoint, her prognosis is poor, and DNR status should be looked into.

## 2017-12-31 LAB
ANION GAP SERPL CALC-SCNC: 13 MMOL/L (ref 10–20)
BASOPHILS # BLD AUTO: 0 THOU/UL (ref 0–0.2)
BASOPHILS NFR BLD AUTO: 0 % (ref 0–1)
BUN SERPL-MCNC: 10 MG/DL (ref 9.8–20.1)
CALCIUM SERPL-MCNC: 8.1 MG/DL (ref 7.8–10.44)
CHLORIDE SERPL-SCNC: 102 MMOL/L (ref 98–107)
CO2 SERPL-SCNC: 24 MMOL/L (ref 23–31)
CREAT CL PREDICTED SERPL C-G-VRATE: 54 ML/MIN (ref 70–130)
EOSINOPHIL # BLD AUTO: 0.1 THOU/UL (ref 0–0.7)
EOSINOPHIL NFR BLD AUTO: 2.6 % (ref 0–10)
GLUCOSE SERPL-MCNC: 155 MG/DL (ref 83–110)
HGB BLD-MCNC: 12.3 G/DL (ref 12–16)
LYMPHOCYTES # BLD: 1.2 THOU/UL (ref 1.2–3.4)
LYMPHOCYTES NFR BLD AUTO: 24.1 % (ref 21–51)
MAGNESIUM SERPL-MCNC: 1.9 MG/DL (ref 1.6–2.6)
MCH RBC QN AUTO: 33.4 PG (ref 27–31)
MCV RBC AUTO: 102 FL (ref 81–99)
MONOCYTES # BLD AUTO: 0.4 THOU/UL (ref 0.11–0.59)
MONOCYTES NFR BLD AUTO: 6.8 % (ref 0–10)
NEUTROPHILS # BLD AUTO: 3.4 THOU/UL (ref 1.4–6.5)
NEUTROPHILS NFR BLD AUTO: 66.5 % (ref 42–75)
PLATELET # BLD AUTO: 162 THOU/UL (ref 130–400)
POTASSIUM SERPL-SCNC: 3.1 MMOL/L (ref 3.5–5.1)
RBC # BLD AUTO: 3.69 MILL/UL (ref 4.2–5.4)
SODIUM SERPL-SCNC: 136 MMOL/L (ref 136–145)
WBC # BLD AUTO: 5.1 THOU/UL (ref 4.8–10.8)

## 2017-12-31 RX ADMIN — CEFTRIAXONE SCH MLS: 1 INJECTION, POWDER, FOR SOLUTION INTRAMUSCULAR; INTRAVENOUS at 09:41

## 2017-12-31 NOTE — PDOC.PN
- Subjective


Encounter Start Date: 12/31/17


Encounter Start Time: 10:16





Patient seen and examined. No new complaints. No overnight events.


Pt non verbal.


No family at bedside.








- Objective


MAR Reviewed: Yes


Vital Signs & Weight: 


 Vital Signs (12 hours)











  Temp Pulse Resp BP BP Pulse Ox


 


 12/31/17 08:00  96.7 F L  68  18  136/74   95


 


 12/31/17 04:00  98.9 F  72  16   129/84  98


 


 12/31/17 03:25       98


 


 12/30/17 22:40  97.2 F L  71  18   143/66 H  98








 Weight











Admit Weight                   101 lb 12.8 oz


 


Weight                         110 lb 12.8 oz














I&O: 


 











 12/30/17 12/31/17 01/01/18





 06:59 06:59 06:59


 


Intake Total 211930 


 


Output Total 775 1050 


 


Balance 1332.3 880 











Result Diagrams: 


 12/31/17 06:14





 12/31/17 06:14





Phys Exam





- Physical Examination


Constitutional: NAD


HEENT: sclera anicteric


Neck: supple


Respiratory: no wheezing, no rales


Cardiovascular: no significant murmur


Gastrointestinal: soft


Musculoskeletal: no edema


Neurological: non-focal


somnolent


Deviation from normal: somnolent


Skin: no rash





Dx/Plan


(1) Encephalopathy


Code(s): G93.40 - ENCEPHALOPATHY, UNSPECIFIED   Status: Acute   





(2) Hypernatremia


Code(s): E87.0 - HYPEROSMOLALITY AND HYPERNATREMIA   Status: Resolved   





(3) Hypokalemia


Code(s): E87.6 - HYPOKALEMIA   Status: Resolved   





(4) Syncope


Code(s): R55 - SYNCOPE AND COLLAPSE   Status: Acute   





(5) Anxiety and depression


Code(s): F41.8 - OTHER SPECIFIED ANXIETY DISORDERS   Status: Chronic   





(6) Hypothyroidism


Code(s): E03.9 - HYPOTHYROIDISM, UNSPECIFIED   Status: Chronic   


Qualifiers: 


   Hypothyroidism type: unspecified   Qualified Code(s): E03.9 - Hypothyroidism

, unspecified   





(7) Physical deconditioning


Code(s): R53.81 - OTHER MALAISE   Status: Chronic   





- Plan


continue antibiotics





* .


continues to be non responsive


continue abx


Neuro consult in am


plan discussed with family


Daughter is pedatrician and is talking with rest of the family to clarify goal 

of care and DNR status.


family out of country till friday.


selected home meds.


No dementia diagnosis done per family.


? parkinson


? Adult failure to thrive


replete electrolytes.


on eliquis.


AM labs.

## 2018-01-01 LAB
ANION GAP SERPL CALC-SCNC: 10 MMOL/L (ref 10–20)
BASOPHILS # BLD AUTO: 0 THOU/UL (ref 0–0.2)
BASOPHILS NFR BLD AUTO: 0.1 % (ref 0–1)
BUN SERPL-MCNC: 13 MG/DL (ref 9.8–20.1)
CALCIUM SERPL-MCNC: 8 MG/DL (ref 7.8–10.44)
CHLORIDE SERPL-SCNC: 102 MMOL/L (ref 98–107)
CO2 SERPL-SCNC: 27 MMOL/L (ref 23–31)
CREAT CL PREDICTED SERPL C-G-VRATE: 55 ML/MIN (ref 70–130)
EOSINOPHIL # BLD AUTO: 0.2 THOU/UL (ref 0–0.7)
EOSINOPHIL NFR BLD AUTO: 3.6 % (ref 0–10)
GLUCOSE SERPL-MCNC: 152 MG/DL (ref 83–110)
HGB BLD-MCNC: 10.6 G/DL (ref 12–16)
LYMPHOCYTES # BLD: 1.7 THOU/UL (ref 1.2–3.4)
LYMPHOCYTES NFR BLD AUTO: 27 % (ref 21–51)
MAGNESIUM SERPL-MCNC: 1.6 MG/DL (ref 1.6–2.6)
MCH RBC QN AUTO: 34.5 PG (ref 27–31)
MCV RBC AUTO: 103 FL (ref 81–99)
MONOCYTES # BLD AUTO: 0.4 THOU/UL (ref 0.11–0.59)
MONOCYTES NFR BLD AUTO: 7.1 % (ref 0–10)
NEUTROPHILS # BLD AUTO: 3.9 THOU/UL (ref 1.4–6.5)
NEUTROPHILS NFR BLD AUTO: 62.3 % (ref 42–75)
PLATELET # BLD AUTO: 200 THOU/UL (ref 130–400)
POTASSIUM SERPL-SCNC: 3.3 MMOL/L (ref 3.5–5.1)
POTASSIUM SERPL-SCNC: 3.8 MMOL/L (ref 3.5–5.1)
RBC # BLD AUTO: 3.07 MILL/UL (ref 4.2–5.4)
SODIUM SERPL-SCNC: 136 MMOL/L (ref 136–145)
WBC # BLD AUTO: 6.3 THOU/UL (ref 4.8–10.8)

## 2018-01-01 RX ADMIN — CEFTRIAXONE SCH MLS: 1 INJECTION, POWDER, FOR SOLUTION INTRAMUSCULAR; INTRAVENOUS at 11:51

## 2018-01-01 RX ADMIN — Medication SCH ML: at 20:39

## 2018-01-01 NOTE — PDOC.PN
- Subjective


Encounter Start Date: 01/01/18


Encounter Start Time: 11:33





pt non verbal and non responsive.


On PEG tube feeding





- Objective


MAR Reviewed: Yes


Vital Signs & Weight: 


 Vital Signs (12 hours)











  Temp Pulse Resp BP BP Pulse Ox


 


 01/01/18 07:18  98.7 F  68  24 H  178/76 H   97


 


 01/01/18 05:30       96


 


 01/01/18 04:00  97.0 F L  77  22 H   136/65  95








 Weight











Admit Weight                   101 lb 12.8 oz


 


Weight                         113 lb














I&O: 


 











 12/31/17 01/01/18 01/02/18





 06:59 06:59 06:59


 


Intake Total 1930 2403 


 


Output Total 1050 720 


 


Balance 880 1683 











Result Diagrams: 


 01/01/18 05:13





 01/01/18 05:13





Phys Exam





- Physical Examination


moderate distress non responsive


HEENT: sclera anicteric


Neck: supple


Respiratory: wheezing present


Cardiovascular: no significant murmur


Gastrointestinal: soft


Musculoskeletal: edema present


non responsive


Skin: no rash





Dx/Plan


(1) Encephalopathy


Code(s): G93.40 - ENCEPHALOPATHY, UNSPECIFIED   Status: Acute   





(2) Hypernatremia


Code(s): E87.0 - HYPEROSMOLALITY AND HYPERNATREMIA   Status: Resolved   





(3) Hypokalemia


Code(s): E87.6 - HYPOKALEMIA   Status: Resolved   





(4) Syncope


Code(s): R55 - SYNCOPE AND COLLAPSE   Status: Acute   





(5) Anxiety and depression


Code(s): F41.8 - OTHER SPECIFIED ANXIETY DISORDERS   Status: Chronic   





(6) Hypothyroidism


Code(s): E03.9 - HYPOTHYROIDISM, UNSPECIFIED   Status: Chronic   


Qualifiers: 


   Hypothyroidism type: unspecified   Qualified Code(s): E03.9 - Hypothyroidism

, unspecified   





(7) Physical deconditioning


Code(s): R53.81 - OTHER MALAISE   Status: Chronic   





- Plan


continue antibiotics, 





* .


replete K Mg and phos


dietician to change tube feeds if possible.


AM labs.


Poor prognosis


Neuro consult in am per family request


family out of town till friday.


daughter, a pediatrician talking with family regarding code status


palliative care consult placed 


continue abx


WBC count better.

## 2018-01-02 LAB
ANION GAP SERPL CALC-SCNC: 8 MMOL/L (ref 10–20)
BASOPHILS # BLD AUTO: 0 THOU/UL (ref 0–0.2)
BASOPHILS NFR BLD AUTO: 0.1 % (ref 0–1)
BUN SERPL-MCNC: 11 MG/DL (ref 9.8–20.1)
CALCIUM SERPL-MCNC: 8 MG/DL (ref 7.8–10.44)
CHLORIDE SERPL-SCNC: 100 MMOL/L (ref 98–107)
CO2 SERPL-SCNC: 31 MMOL/L (ref 23–31)
CREAT CL PREDICTED SERPL C-G-VRATE: 62 ML/MIN (ref 70–130)
EOSINOPHIL # BLD AUTO: 0.2 THOU/UL (ref 0–0.7)
EOSINOPHIL NFR BLD AUTO: 3.8 % (ref 0–10)
GLUCOSE SERPL-MCNC: 112 MG/DL (ref 83–110)
HGB BLD-MCNC: 11.5 G/DL (ref 12–16)
LYMPHOCYTES # BLD: 1.5 THOU/UL (ref 1.2–3.4)
LYMPHOCYTES NFR BLD AUTO: 24.8 % (ref 21–51)
MAGNESIUM SERPL-MCNC: 1.8 MG/DL (ref 1.6–2.6)
MCH RBC QN AUTO: 33.7 PG (ref 27–31)
MCV RBC AUTO: 102 FL (ref 81–99)
MONOCYTES # BLD AUTO: 0.4 THOU/UL (ref 0.11–0.59)
MONOCYTES NFR BLD AUTO: 7 % (ref 0–10)
NEUTROPHILS # BLD AUTO: 3.8 THOU/UL (ref 1.4–6.5)
NEUTROPHILS NFR BLD AUTO: 64.3 % (ref 42–75)
PLATELET # BLD AUTO: 238 THOU/UL (ref 130–400)
POTASSIUM SERPL-SCNC: 4 MMOL/L (ref 3.5–5.1)
RBC # BLD AUTO: 3.41 MILL/UL (ref 4.2–5.4)
SODIUM SERPL-SCNC: 135 MMOL/L (ref 136–145)
WBC # BLD AUTO: 5.9 THOU/UL (ref 4.8–10.8)

## 2018-01-02 RX ADMIN — CEFTRIAXONE SCH MLS: 1 INJECTION, POWDER, FOR SOLUTION INTRAMUSCULAR; INTRAVENOUS at 10:58

## 2018-01-02 RX ADMIN — Medication SCH: at 21:00

## 2018-01-02 RX ADMIN — Medication SCH: at 10:56

## 2018-01-02 NOTE — CON
DATE OF CONSULTATION:  01/01/2018

 

REFERRING PHYSICIAN:  Roldan Portillo MD

 

REASON FOR CONSULTATION:  Altered mental status.

 

HISTORY OF PRESENT ILLNESS:  Ms. Cagle is an 87-year-old  female who has been considered 
for evaluation of altered mental status.  History is obtained from daughter over the phone.  Daughter
 reports that the patient on 12/24/2017 was found to be unresponsive.  Since then, she has been nonve
rbal and noncommunicative.  She states that she has had increasing difficulty with speech and communi
cation over the past 2-3 months and has been becoming less and less verbally communicative; however, 
this worsened on 12/24/2017.  She also states that she has been having some difficulty with memory an
d cognition over the past few months that has gradually got worse over time.  She also noticed that o
leighton the past 1-1/2 years, she has not been able to walk even with the support of walker.  She reports
 that prior to that, she was walking with a walker; however, she had difficulty with the balance and 
felt like she was going to fall and thus she decided to become wheelchair bound.  She has noted tremo
rs in her left hand for few years and recently over the past few months has noted tremors in her righ
t hand.  She has a history of stroke in 2015, which led to left hemianopia.

 

PAST MEDICAL HISTORY:  Significant for hypertension, history of stroke, left hemianopia, hypothyroidi
sm, depression, and atrial fibrillation.

 

PAST SURGICAL HISTORY:  Significant for hysterectomy.

 

SOCIAL HISTORY:  There is no history of smoking, alcohol use, or illicit drug use.

 

FAMILY HISTORY:  Noncontributory.

 

CURRENT MEDICATIONS:  Please review MAR.

 

ALLERGIES:  Include CIPROFLOXACIN and CLINDAMYCIN.

 

REVIEW OF SYSTEMS:  Unable to perform.

 

PHYSICAL EXAMINATION:

VITAL SIGNS:  Blood pressure 146/79, pulse of 68, temperature of 97.4, respirations of 26, and O2 sat
s of 95% on room air.

GENERAL:  Thin-appearing  female resting in bed in no apparent distress.

RESPIRATORY:  Clear to auscultation bilaterally.

CARDIOVASCULAR:  Regular rate and rhythm.

NEUROLOGIC:  Mental Status:  The patient is obtunded.  She does wake up to verbal stimuli.  She opens
 her eyes, but is nonverbal and noncommunicative.  She does not follow any commands.  Cranial nerves:
  Pupils are 3 mm and reactive.  She does not blink to threat on left side.  Face appears symmetric. 
 Motor exam showed increased tone and rigidity of both upper extremities.  There is a flexion contrac
ture noted in both wrists.  There are tremors noted in both hands.  She does not withdraw to pain or 
to minimal pressure on both upper and lower extremities.

 

LABORATORY DATA:  Labs are reviewed which included CBC, CMP, phosphorus, and magnesium, which is sign
ificant for hemoglobin 10.6, hematocrit 31.7, potassium of 3.3, phosphorus of 1.8, glucose of 152; ot
herwise unremarkable.

 

IMAGING STUDIES:  CT head without contrast done on 12/24/2017 was reviewed, which showed remote infar
ction involving the right occipital and left parietooccipital region.  There is also severe chronic m
icrovascular ischemic changes noted.

 

IMPRESSION:  Altered mental status, likely toxic-metabolic encephalopathy along with the probable und
erlying dementia.

 

ASSESSMENT AND PLAN:  Ms. Cagle is an 87-year-old  female who presented with the changes 
in mentation.  After discussion with the daughter, it is likely that the patient may have had ongoing
 gradual progression of dementia that has now have an acute changes.  At this time, we will recommend
 obtaining MRI brain without contrast and EEG for further evaluation.  I will follow the results of a
tierra studies and provide further recommendations at that time.

 

Thank you for the consultation.

## 2018-01-02 NOTE — PDOC.PN
- Subjective


Encounter Start Date: 01/02/18


Encounter Start Time: 09:44





pt non verbal.


No N/V





- Objective


MAR Reviewed: Yes


Vital Signs & Weight: 


 Vital Signs (12 hours)











  Temp Pulse Resp BP Pulse Ox


 


 01/02/18 02:56  98.0 F  73  19  141/66 H  97








 Weight











Admit Weight                   101 lb 12.8 oz


 


Weight                         120 lb 4.8 oz














I&O: 


 











 01/01/18 01/02/18 01/03/18





 06:59 06:59 06:59


 


Intake Total 2403 2700 


 


Output Total 720 2700 


 


Balance 1683 0 











Result Diagrams: 


 01/02/18 05:03





 01/02/18 05:03





Phys Exam





- Physical Examination


Constitutional: NAD


HEENT: sclera anicteric


Neck: supple


Respiratory: no wheezing, no rales


Cardiovascular: RRR


Gastrointestinal: soft


Musculoskeletal: no edema


somnolent


Deviation from normal: somnolent


Skin: no rash





Dx/Plan


(1) Encephalopathy


Code(s): G93.40 - ENCEPHALOPATHY, UNSPECIFIED   Status: Acute   





(2) Hypernatremia


Code(s): E87.0 - HYPEROSMOLALITY AND HYPERNATREMIA   Status: Resolved   





(3) Hypokalemia


Code(s): E87.6 - HYPOKALEMIA   Status: Resolved   





(4) Syncope


Code(s): R55 - SYNCOPE AND COLLAPSE   Status: Acute   





(5) Anxiety and depression


Code(s): F41.8 - OTHER SPECIFIED ANXIETY DISORDERS   Status: Chronic   





(6) Hypothyroidism


Code(s): E03.9 - HYPOTHYROIDISM, UNSPECIFIED   Status: Chronic   


Qualifiers: 


   Hypothyroidism type: unspecified   Qualified Code(s): E03.9 - Hypothyroidism

, unspecified   





(7) Physical deconditioning


Code(s): R53.81 - OTHER MALAISE   Status: Chronic   





- Plan


cont current plan of care, continue antibiotics, PT/OT, 





* .


continue PEG tube feeds


appreciate neuro input.


check MRI and EEG


on eliquis


PT/OT


Dc planning


palliative care consult - clarification of goals of care.


Pt is full code.


Poor prognosis.

## 2018-01-03 LAB
ANION GAP SERPL CALC-SCNC: 12 MMOL/L (ref 10–20)
BUN SERPL-MCNC: 13 MG/DL (ref 9.8–20.1)
CALCIUM SERPL-MCNC: 8.2 MG/DL (ref 7.8–10.44)
CHLORIDE SERPL-SCNC: 99 MMOL/L (ref 98–107)
CO2 SERPL-SCNC: 26 MMOL/L (ref 23–31)
CREAT CL PREDICTED SERPL C-G-VRATE: 61 ML/MIN (ref 70–130)
GLUCOSE SERPL-MCNC: 134 MG/DL (ref 83–110)
MAGNESIUM SERPL-MCNC: 1.6 MG/DL (ref 1.6–2.6)
POTASSIUM SERPL-SCNC: 4.2 MMOL/L (ref 3.5–5.1)
SODIUM SERPL-SCNC: 133 MMOL/L (ref 136–145)

## 2018-01-03 RX ADMIN — Medication SCH ML: at 21:00

## 2018-01-03 RX ADMIN — Medication SCH ML: at 11:50

## 2018-01-03 RX ADMIN — CEFTRIAXONE SCH MLS: 1 INJECTION, POWDER, FOR SOLUTION INTRAMUSCULAR; INTRAVENOUS at 10:07

## 2018-01-03 NOTE — PRG
DATE OF SERVICE:  01/03/2018

 

SUBJECTIVE:  There has not been any significant change in neurological exam over the past 24 hours.  
According to the nurse, there has not been any episodes of seizure-like activity.  She continues to b
e obtunded and nonverbal.  She is not following any commands.

 

OBJECTIVE:

VITAL SIGNS:  Blood pressure of 145/67, pulse of 75, temperature of 97.9, respirations of 20 and O2 s
ats of 93%.

GENERAL:  Thin appearing  female in no apparent distress.

RESPIRATORY:  Clear to auscultation bilaterally.

CARDIOVASCULAR:  Regular rate and rhythm.

NEUROLOGICAL:  Essentially unchanged when compared to previous visit.

 

IMAGING DATA:  EEG was reviewed, which showed mild nonspecific cerebral dysfunction without any epile
ptiform discharges, subtransient _____ symmetric noted.

 

IMPRESSION:

1.  Altered mental status, likely toxic metabolic encephalopathy.

2.  Probable Alzheimer type dementia.

 

PLAN:  Ms. Cagle is an 87-year-old  female who presented with the gradual decline in her 
mentation and becoming nonverbal.  Her EEG did not show any episodes of any sign of epileptiform disc
harges or subtransients.  It showed mild slowing.  This would be consistent with a toxic metabolic en
cephalopathy.  This can also be seen in patients with the underlying dementia.  She is unfortunately 
not able to undergo MRI as she was not able to lay flat.  I have tried to call the patient's daughter
, Ms. Mag Gurjit Colin.  I have left her a voice mail to call back in my office tomorrow.  In my op
inion, this is likely her new baseline.  I would recommend consulting palliative care and probable di
scussion of hospice with the patient's family.  There is no further neurological workup needed from m
y standpoint.

## 2018-01-03 NOTE — PDOC.PN
- Subjective


Encounter Start Date: 01/03/18


Encounter Start Time: 12:38


-: non-verbal





- Objective


Vital Signs & Weight: 


 Vital Signs (12 hours)











  Temp Pulse Resp BP Pulse Ox


 


 01/03/18 08:00  97.5 F L  65  18  133/63  96


 


 01/03/18 04:00  99.8 F H  73  20  134/63  95








 Weight











Admit Weight                   101 lb 12.8 oz


 


Weight                         119 lb 12.8 oz














I&O: 


 











 01/02/18 01/03/18 01/04/18





 06:59 06:59 06:59


 


Intake Total 2700 2720 


 


Output Total 2700 2200 


 


Balance 0 520 











Result Diagrams: 


 01/02/18 05:03





 01/03/18 05:20





Phys Exam





- Physical Examination


Constitutional: NAD


dry mucous membranes


Respiratory: no wheezing, no rhonchi


Cardiovascular: RRR


Gastrointestinal: soft


PEG in place


Musculoskeletal: edema present


no movement


Deviation from normal: non responsive





Dx/Plan


(1) Encephalopathy


Code(s): G93.40 - ENCEPHALOPATHY, UNSPECIFIED   Status: Acute   





(2) Alzheimer disease


Code(s): G30.9 - ALZHEIMER'S DISEASE, UNSPECIFIED   Status: Chronic   


Qualifiers: 


   Alzheimer's disease onset: unspecified onset 





(3) Dehydration


Code(s): E86.0 - DEHYDRATION   Status: Resolved   





(4) Hypernatremia


Code(s): E87.0 - HYPEROSMOLALITY AND HYPERNATREMIA   Status: Resolved   





(5) Hypertension


Code(s): I10 - ESSENTIAL (PRIMARY) HYPERTENSION   Status: Chronic   





(6) Physical deconditioning


Code(s): R53.81 - OTHER MALAISE   Status: Chronic   





- Plan


cont current plan of care


neuro w/u pending. continue supportive care.





* .

## 2018-01-04 LAB
HGB BLD-MCNC: 11.4 G/DL (ref 12–16)
PLATELET # BLD AUTO: 290 THOU/UL (ref 130–400)

## 2018-01-04 RX ADMIN — Medication SCH ML: at 11:00

## 2018-01-04 RX ADMIN — Medication SCH: at 21:06

## 2018-01-04 RX ADMIN — CEFTRIAXONE SCH MLS: 1 INJECTION, POWDER, FOR SOLUTION INTRAMUSCULAR; INTRAVENOUS at 11:02

## 2018-01-04 NOTE — PRG
DATE OF SERVICE:  01/04/2018

 

SUBJECTIVE:  The patient is seen and examined at bedside.  There was a family member by her bedside. 
 The patient is not communicate.  She does not respond to verbal stimuli.  She responds to painful st
imuli.  Her oral mucosa is dry.

 

OBJECTIVE:

LUNGS:  Clear.

HEART:  S1, S2 normal.

ABDOMEN:  Soft, nontender.  PEG tube is in place.  She has contraction from previous CVA on the left 
side.

 

LABORATORY DATA:  Hemoglobin of 11.4, hematocrit 34.5, platelet count is 290,000.  Microbiology:  No 
new findings.

 

IMPRESSION:

1.  Encephalopathy, acute.

2.  Alzheimer dementia.

3.  Dehydration, resolved.

4.  Hypernatremia, resolved.

5.  Hypertension, chronic.

6.  Physical deconditioning.

 

PLAN:  Apparently, the family is coming back from destination wedding tonight and they will having me
eting and make a decision about hospice care.  At this point, we will continue current regimen and ca
re.

## 2018-01-05 LAB
ALBUMIN SERPL BCG-MCNC: 2.1 G/DL (ref 3.4–4.8)
ALP SERPL-CCNC: 79 U/L (ref 40–150)
ALT SERPL W P-5'-P-CCNC: 87 U/L (ref 8–55)
ANION GAP SERPL CALC-SCNC: 10 MMOL/L (ref 10–20)
AST SERPL-CCNC: 70 U/L (ref 5–34)
BASOPHILS # BLD AUTO: 0 THOU/UL (ref 0–0.2)
BASOPHILS NFR BLD AUTO: 0.2 % (ref 0–1)
BILIRUB SERPL-MCNC: 0.2 MG/DL (ref 0.2–1.2)
BUN SERPL-MCNC: 14 MG/DL (ref 9.8–20.1)
CALCIUM SERPL-MCNC: 8.1 MG/DL (ref 7.8–10.44)
CHLORIDE SERPL-SCNC: 100 MMOL/L (ref 98–107)
CO2 SERPL-SCNC: 27 MMOL/L (ref 23–31)
CREAT CL PREDICTED SERPL C-G-VRATE: 58 ML/MIN (ref 70–130)
CRYSTAL-AUWI FLAG: 0 (ref 0–15)
EOSINOPHIL # BLD AUTO: 0.2 THOU/UL (ref 0–0.7)
EOSINOPHIL NFR BLD AUTO: 3.2 % (ref 0–10)
GLOBULIN SER CALC-MCNC: 3.1 G/DL (ref 2.4–3.5)
GLUCOSE SERPL-MCNC: 124 MG/DL (ref 83–110)
HEV IGM SER QL: 1.7 (ref 0–7.99)
HGB BLD-MCNC: 10.4 G/DL (ref 12–16)
HYALINE CASTS #/AREA URNS LPF: (no result) LPF
LYMPHOCYTES # BLD: 1.4 THOU/UL (ref 1.2–3.4)
LYMPHOCYTES NFR BLD AUTO: 22.3 % (ref 21–51)
MCH RBC QN AUTO: 34.2 PG (ref 27–31)
MCV RBC AUTO: 103 FL (ref 81–99)
MONOCYTES # BLD AUTO: 0.5 THOU/UL (ref 0.11–0.59)
MONOCYTES NFR BLD AUTO: 8 % (ref 0–10)
NEUTROPHILS # BLD AUTO: 4.3 THOU/UL (ref 1.4–6.5)
NEUTROPHILS NFR BLD AUTO: 66.2 % (ref 42–75)
PATHC CAST-AUWI FLAG: 0.13 (ref 0–2.49)
PLATELET # BLD AUTO: 300 THOU/UL (ref 130–400)
POTASSIUM SERPL-SCNC: 3.6 MMOL/L (ref 3.5–5.1)
RBC # BLD AUTO: 3.03 MILL/UL (ref 4.2–5.4)
RBC UR QL AUTO: (no result) HPF (ref 0–3)
SODIUM SERPL-SCNC: 133 MMOL/L (ref 136–145)
SP GR UR STRIP: 1.01 (ref 1–1.04)
SPERM-AUWI FLAG: 0 (ref 0–9.9)
T4 FREE SERPL-MCNC: 1.01 NG/DL (ref 0.7–1.48)
WBC # BLD AUTO: 6.4 THOU/UL (ref 4.8–10.8)
YEAST-AUWI FLAG: 0 (ref 0–25)

## 2018-01-05 RX ADMIN — Medication SCH: at 14:00

## 2018-01-05 RX ADMIN — Medication SCH ML: at 22:16

## 2018-01-05 NOTE — PRG
DATE OF SERVICE:  01/05/2018

 

SUBJECTIVE:  The patient is seen and examined at the bedside.  Her daughter Gurjit is back and I sp
ent an additional 20 minutes to discuss the case with her to keep her update on her mother's situatio
n.

 

OBJECTIVE:

VITAL SIGNS:  Blood pressure is 140/67, pulse is 72, pulse oximeter is 94% on room air, respiratory r
ate is 18, temperature is fluctuating from 99.1-100.3.

GENERAL:  The patient is not very responsive.  She opens her eyes, but she does not communicate with 
us at all.  She is quite comatose.  She does not follow my commands.

LUNGS:  Breath sounds somewhat diminished at both bases.

HEART:  S1, S2 normal, no S3, no S4.

ABDOMEN:  Soft and nontender.

EXTREMITIES:  No clubbing, cyanosis, edema.

NEUROLOGICAL:  As above.

 

LABORATORY DATA:  Showed sodium of 133, potassium 3.6, chloride 100, CO2 27, BUN 14, creatinine 0.59,
 glucose 124, AST 70, ALT 87, alkaline phosphatase 79, total protein is 5.2, albumin 2.1, Free T3 is 
1.63, Free T4 is 1.01 and third generation of TSH is 1.0253.  Urinalysis came back within normal limi
ts.  Her white count is 6.4, hemoglobin 10.4, hematocrit 31.2, platelet count 300,000.

 

IMPRESSION:

1.  Encephalopathy of unclear etiology.  The patient is not responsive as before.  There is no change
 to her mental status.  The patient was seen by neurologist recently and he did not recommend further
 neurological workup.  Apparently, he found on the previous CTs that there was some old strokes from 
the past, which could affect her potential for recovery from this metabolic insult.

2.  Fever of unclear etiology.  Her urine is within normal limits.  Her white count is normal.  We wi
ll obtain a chest x-ray and she just finished antibiotic treatment.

3.  Dehydration, resolved.

4.  Hypernatremia, resolved.

5.  Hypertension, chronic, stable.

6.  Physical deconditioning.

 

DISCUSSION:  Apparently, the patient did not have any dementia prior to this hospitalization accordin
g to her daughter and she was able to communicate with her quite clearly.  One possible explanation o
f her encephalopathy is that her metabolic insult affected her mental condition and her recovery to t
he point that she would need much prolonged process to recover from that if she can.  At this point, 
we will continue her PEG tube feeding and we will check on the chest x-ray results and look for expla
nation of why she has temperature.

## 2018-01-05 NOTE — RAD
FRONTAL VIEW CHEST:

 

Indication: Fever. 

 

FINDINGS: 

There is density at the left lung base, obscuring portions of the left hemidiaphragm. Cardiac silhoue
tte is prominent, some of which is due to portable technique. There is a right sided vascular cathete
r. Vascular calcification and osseous degenerative change. Extrinsic artifact limits detail. 

 

IMPRESSION: 

Left basilar opacity with obscuration of portions of the left hemidiaphragm. This may be on the basis
 of scar, volume loss, and/or pneumonia. Recommend continued imaging follow up with dedicated two vie
w chest. 

 

POS: [facility]

## 2018-01-06 LAB
HGB BLD-MCNC: 10.3 G/DL (ref 12–16)
PLATELET # BLD AUTO: 305 THOU/UL (ref 130–400)

## 2018-01-06 RX ADMIN — Medication SCH ML: at 09:37

## 2018-01-06 RX ADMIN — Medication SCH ML: at 21:22

## 2018-01-06 RX ADMIN — CEFEPIME HYDROCHLORIDE SCH MLS: 1 INJECTION, POWDER, FOR SOLUTION INTRAMUSCULAR; INTRAVENOUS at 13:05

## 2018-01-06 RX ADMIN — VANCOMYCIN HYDROCHLORIDE SCH MLS: 750 INJECTION, POWDER, LYOPHILIZED, FOR SOLUTION INTRAVENOUS at 13:11

## 2018-01-06 NOTE — PRG
DATE OF SERVICE:  01/06/2018

 

SUBJECTIVE:  The patient is seen and examined at bedside.  She is in deep sleep.  She is awake, but s
he does not communicate with me.  She is not bearable.  Yesterday, I spent additional time with her bettye tirado who is a retired pediatrician and she understands the complexity of this lady's problems.

 

OBJECTIVE:

VITAL SIGNS:  Blood pressure is 136/68, pulse is 74, respiratory rate is 22, O2 is 96%.  Temperature 
maximum is 100.1.  Yesterday actually last night, the temperature was down to 98.

GENERAL:  No communication with this lady.  She does not follow my commands.

LUNGS:  Breath sounds somewhat diminished at both bases.  No wheezing, no rales.

HEART:  S1, S2 normal.

ABDOMEN:  Soft.  PEG tube in place.

EXTREMITIES:  No clubbing, cyanosis or edema.

NEUROLOGIC:  As mentioned above, she is nonverbal.  She does not follow commands.  She is arousable, 
but each time by a walk-in to see her, she is sleeping.

 

LABORATORY DATA:  Showed hemoglobin of 10.3, hematocrit 30.9.  Urinalysis within normal limits.  Micr
obiology:  Two blood cultures came back negative.  Urine culture negative.

 

IMPRESSION: 

1.  Fever of unclear etiology with completion of her Rocephin 2 days ago.  Two blood cultures negativ
e.  Urine culture negative.  Her white count is within normal limits.  Chest x-ray showed some infilt
rate in the left lower lobe of unclear etiology.  We will obtain consultation with Dr. Gutierrez for Infe
ctious Disease's evaluation and see whether she needs to be treated for pneumonia.

2.  Dehydration, resolved.

3.  Hypernatremia, resolved.

4.  Hypertension, chronic, stable.

5.  Physical deconditioning.

6.  Percutaneous endoscopic gastrostomy tube feeding.

7.  Paroxysmal atrial fibrillation, on Eliquis.  I will restart her on empiric antibiotic to cover fo
r possible left lower lobe pneumonia and will see what ID recommended.

## 2018-01-06 NOTE — ULT
ULTRASOUND WITH DOPPLER DUPLEX VENOUS LOWER EXTREMITY BILATERAL:

 

CPT: 61398

ICD-10-PCS: B54D

 

HISTORY: 

Edema, fever.

 

TECHNIQUE:

Color flow Doppler, spectral waveform analysis of pulsed Doppler, and gray-scale imaging with janelle
farooq and augmentation, were used to evaluate the bilateral common femoral, femoral, popliteal, poster
ior tibial, and superficial femoral, veins; and the proximal portions of the profunda femoral and gre
ater saphenous, veins.

 

FINDINGS:

Appropriate compressibility and flow within the imaged deep vein system of each lower extremity.  The
re is mild soft tissue edema seen.

 

IMPRESSION:

1.  No deep vein thrombosis.

2.  Soft tissue edema.

 

POS: [facility]

## 2018-01-07 RX ADMIN — CEFEPIME HYDROCHLORIDE SCH MLS: 1 INJECTION, POWDER, FOR SOLUTION INTRAMUSCULAR; INTRAVENOUS at 01:02

## 2018-01-07 RX ADMIN — VANCOMYCIN HYDROCHLORIDE SCH MLS: 750 INJECTION, POWDER, LYOPHILIZED, FOR SOLUTION INTRAVENOUS at 01:03

## 2018-01-07 RX ADMIN — Medication SCH ML: at 08:40

## 2018-01-07 RX ADMIN — VANCOMYCIN HYDROCHLORIDE SCH MLS: 750 INJECTION, POWDER, LYOPHILIZED, FOR SOLUTION INTRAVENOUS at 13:30

## 2018-01-07 RX ADMIN — Medication SCH ML: at 21:02

## 2018-01-07 RX ADMIN — CEFEPIME HYDROCHLORIDE SCH MLS: 1 INJECTION, POWDER, FOR SOLUTION INTRAMUSCULAR; INTRAVENOUS at 13:26

## 2018-01-07 NOTE — PDOC.PN
- Subjective


Encounter Start Date: 01/07/18


Encounter Start Time: 08:20





Pt was seen for followup re: fevers.  Nonverbal, unable to obtain history or 

ROS.





- Objective


MAR Reviewed: Yes


Vital Signs & Weight: 


 Vital Signs (12 hours)











  Temp Pulse Resp BP BP Pulse Ox


 


 01/07/18 11:28  99.6 F  72  21 H  136/63   95


 


 01/07/18 08:33  99.8 F H  73  21 H  161/73 H   93 L


 


 01/07/18 04:00  97.8 F  64  18   141/63 H  95








 Weight











Admit Weight                   101 lb 12.8 oz


 


Weight                         120 lb 1.6 oz














I&O: 


 











 01/06/18 01/07/18 01/08/18





 06:59 06:59 06:59


 


Intake Total 1302 1753 220


 


Output Total 850 1550 


 


Balance 452 203 220











Result Diagrams: 


 01/06/18 04:48





 01/05/18 11:42





Phys Exam





- Physical Examination


Nonverbal


HEENT: moist MMs


Neck: supple


Respiratory: clear to auscultation bilateral


Cardiovascular: RRR


Neurological: moves all 4 limbs


Psychiatric: normal affect





Dx/Plan


(1) Fever


Code(s): R50.9 - FEVER, UNSPECIFIED   Status: Acute   





(2) Anxiety and depression


Code(s): F41.8 - OTHER SPECIFIED ANXIETY DISORDERS   Status: Chronic   





(3) Dyslipidemia


Code(s): E78.5 - HYPERLIPIDEMIA, UNSPECIFIED   Status: Chronic   





(4) Hypertension


Code(s): I10 - ESSENTIAL (PRIMARY) HYPERTENSION   Status: Chronic   





(5) Hypothyroidism


Code(s): E03.9 - HYPOTHYROIDISM, UNSPECIFIED   Status: Chronic   


Qualifiers: 


   Hypothyroidism type: unspecified   Qualified Code(s): E03.9 - Hypothyroidism

, unspecified   





- Plan





* .


Continue cefepime, vancomycin.


Await ID service consult.


Low-grade fevers yesterday.


All cultures negative so far.











Review of Systems





- Review of Systems


Respiratory: negative: Cough, Dry, Shortness of Breath, Hemoptysis, SOB with 

Excertion, Pleuritic Pain, Sputum, Wheezing


Cardiovascular: negative: chest pain, palpitations, orthopnea, paroxysmal 

nocturnal dyspnea, edema, light headedness





- Medications/Allergies


Allergies/Adverse Reactions: 


 Allergies











Allergy/AdvReac Type Severity Reaction Status Date / Time


 


ciprofloxacin [From Cipro] Allergy   Verified 12/25/17 01:20


 


ciprofloxacin HCl Allergy   Verified 12/25/17 01:20





[From Cipro]     


 


clindamycin Allergy   Verified 12/25/17 01:20











Medications: 


 Current Medications





Acetaminophen (Tylenol Elixir)  650 mg PER TUBE Q4H PRN


   PRN Reason: Headache/Fever or Pain


   Last Admin: 01/06/18 09:36 Dose:  650 mg


Albuterol/Ipratropium (Duoneb)  3 ml NEB Q4H PRN


   PRN Reason: SOB or Anxiety


Lipase/Protease/Amylase ([place] [address])  1 cap FS .PER PROTOCOL PRN


   PRN Reason: TUBE OCCLUSION PROTOCOL


Apixaban (Eliquis)  2.5 mg PO BID [facility]


   Last Admin: 01/07/18 08:39 Dose:  2.5 mg


Dronedarone (Multaq)  400 mg PO BID [facility]


   Last Admin: 01/07/18 08:39 Dose:  400 mg


Cefepime HCl 1 gm/Miscellaneous Medication 1 each/ Sterile Water  10 mls @ 120 

mls/hr SLOW IVP 0200,1400 [facility]


   Last Admin: 01/07/18 13:26 Dose:  10 mls


Vancomycin HCl 750 mg/ Sodium (Chloride)  250 mls @ 250 mls/hr IVPB 0100,1300 

[facility]


   Last Admin: 01/07/18 13:30 Dose:  250 mls


Miscellaneous Medication (Pharmacy To Dose)  1 each IVPB PRN PRN


   PRN Reason: Pharmacy to dose


Ondansetron HCl (Zofran)  4 mg IVP Q6H PRN


   PRN Reason: Nausea/Vomiting


Sodium Bicarbonate (Bicarbonate, Sodium)  650 mg PER TUBE .PER PROTOCOL PRN


   PRN Reason: ENTERAL TUBE OCCLUSION


Sodium Chloride (Flush - Normal Saline)  10 ml IVF Q12HR JAG


   Last Admin: 01/07/18 08:40 Dose:  10 ml


Sodium Chloride (Flush - Normal Saline)  10 ml IVF PRN PRN


   PRN Reason: Saline Flush


   Last Admin: 01/07/18 01:15 Dose:  10 ml

## 2018-01-08 LAB
HGB BLD-MCNC: 10.6 G/DL (ref 12–16)
PLATELET # BLD AUTO: 399 THOU/UL (ref 130–400)
VANCOMYCIN TROUGH SERPL-MCNC: 15.1 UG/ML

## 2018-01-08 RX ADMIN — CEFEPIME HYDROCHLORIDE SCH MLS: 1 INJECTION, POWDER, FOR SOLUTION INTRAMUSCULAR; INTRAVENOUS at 02:07

## 2018-01-08 RX ADMIN — VANCOMYCIN HYDROCHLORIDE SCH MLS: 750 INJECTION, POWDER, LYOPHILIZED, FOR SOLUTION INTRAVENOUS at 00:55

## 2018-01-08 RX ADMIN — VANCOMYCIN HYDROCHLORIDE SCH MLS: 750 INJECTION, POWDER, LYOPHILIZED, FOR SOLUTION INTRAVENOUS at 13:08

## 2018-01-08 RX ADMIN — CEFEPIME HYDROCHLORIDE SCH MLS: 1 INJECTION, POWDER, FOR SOLUTION INTRAMUSCULAR; INTRAVENOUS at 13:08

## 2018-01-08 RX ADMIN — Medication SCH ML: at 22:08

## 2018-01-08 RX ADMIN — Medication SCH ML: at 08:13

## 2018-01-08 NOTE — PDOC.PN
- Subjective


Encounter Start Date: 01/08/18


Encounter Start Time: 08:28


-: non-verbal


Subjective: no distress





- Objective


MAR Reviewed: Yes


Vital Signs & Weight: 


 Vital Signs (12 hours)











  Temp Pulse Resp BP BP Pulse Ox


 


 01/08/18 08:00  98.8 F  73  22 H   143/67 H  94 L


 


 01/08/18 04:00  97.7 F  71  24 H  142/96 H  


 


 01/08/18 00:00  98.4 F  72  24 H    96








 Weight











Admit Weight                   101 lb 12.8 oz


 


Weight                         121 lb 1.6 oz














I&O: 


 











 01/07/18 01/08/18 01/09/18





 06:59 06:59 06:59


 


Intake Total 1753 2581 


 


Output Total 1550 2400 


 


Balance 203 181 











Result Diagrams: 


 01/08/18 04:49





 01/05/18 11:42





Phys Exam





- Physical Examination


Constitutional: NAD


Neck: no JVD


Respiratory: clear to auscultation bilateral


Cardiovascular: no significant murmur, irregular


Gastrointestinal: soft, positive bowel sounds


Musculoskeletal: no edema





Dx/Plan


(1) Dehydration


Code(s): E86.0 - DEHYDRATION   Status: Resolved   





(2) Hypernatremia


Code(s): E87.0 - HYPEROSMOLALITY AND HYPERNATREMIA   Status: Resolved   





(3) Hypokalemia


Code(s): E87.6 - HYPOKALEMIA   Status: Resolved   





(4) Alzheimer disease


Code(s): G30.9 - ALZHEIMER'S DISEASE, UNSPECIFIED   Status: Chronic   


Qualifiers: 


   Alzheimer's disease onset: unspecified onset 





(5) Dyslipidemia


Code(s): E78.5 - HYPERLIPIDEMIA, UNSPECIFIED   Status: Chronic   





(6) Hypertension


Code(s): I10 - ESSENTIAL (PRIMARY) HYPERTENSION   Status: Chronic   





(7) Hypothyroidism


Code(s): E03.9 - HYPOTHYROIDISM, UNSPECIFIED   Status: Chronic   


Qualifiers: 


   Hypothyroidism type: unspecified   Qualified Code(s): E03.9 - Hypothyroidism

, unspecified   





(8) Encephalopathy


Code(s): G93.40 - ENCEPHALOPATHY, UNSPECIFIED   Status: Acute   





- Plan


cont current plan of care


will discuss with family, formulate gfoals, plan





* .

## 2018-01-08 NOTE — EEG
*******************************************************************************
********************************************************************************
*****

Referring Physician: DR. OK CHAVEZ   

 *******************************************************************************
********************************************************************************
*****

EEG # [id number]

TEST TYPE: ROUTINE PORTABLE INPATIENT



REPORT:



AN EEG USING THE INTERNATIONAL TEN-TWENTY SYSTEM OF ELECTRODE PLACEMENT WAS 
PERFORMED.

ACCOUNT # [id number]

REASON FOR EEG:    Altered Mental Status

DATE OF EE2018



EEG DESCRIPTION:



This is a 21 channel EEG recording.  Electrodes were placed using 10-20 
international electrode placement system.   Background rhythm is predominantly 5
-6 Hz, low to medium voltage theta rhythm.  This EEG is significantly affected 
by tremors noted in both hands.  There are no epileptiform discharges, sharp 
transients or asymmetry noted.  



HYPERVENTIALTION:  Could not be performed.  

PHOTIC STIMULATION: Showed no effect. 



EKG LEAD:  Shows 66 beats per minute, irregular rhythm.





IMPRESSION:  This is abnormal EEG.  It shows mild nonspecific cerebral 
dysfunction.  However, there are no epileptiform discharges, sharp transients 
or asymmetry noted.  This EEG is significantly degraded by motion artifact due 
to her tremors.  Please correlate these findings clinically.

[]





Technician: PAUL

: [email or web address] HERNANDEZ

## 2018-01-08 NOTE — CON
DATE OF CONSULTATION:  01/07/2018

 

REASON FOR CONSULTATION:  Fever.

 

HISTORY OF PRESENT ILLNESS:  An 87-year-old who has a history of prior CVA with hypertension and atri
al fibrillation, who was a nursing home resident and developed change in mental status.  On arrival, 
blood pressure 120/60, heart rate 150, respiratory rate 18, O2 sat 92%.  She has inspiratory crackles
 in the lung examination.  The heart exam showed irregular rate.  Initial white cell count 12,000, he
moglobin 14.  INR 1.8.  Sodium 156, creatinine 1.56.  Lactic acid 3.1.  CT of head without contrast w
as stable.  Chest x-ray with no acute intrathoracic disease.  The urinalysis 21 to 50 wbc's.  Initial
 assessment was possible UTI, sepsis, possibly secondary to urinary tract infection, acute kidney inj
ury, and non-ST segment elevation myocardial infarction.  Cardiology evaluation was obtained.  The im
pression was lethargy, multifactorial, probably from delirium associated with decreased oral intake w
ith volume depletion and paroxysmal atrial fibrillation, which converted to sinus rhythm, was spontan
eously on Cardizem drip.  Small vessel coronary disease, which was maintained on Multaq and IV Cardiz
em.  She was given Lovenox because of inability to take medications by mouth.  Pulmonary Medicine was
 consulted and the impression was about the same.  After discussion with family, decision was made to
 insert a gastrostomy tube.  She was done in 12/28/2017 and actually, she had some low grade temperat
ure elevation.  A venogram was done, which was negative.  Numerous blood cultures have been negative 
thus far.  The T-max was 100.1.  Currently, Ms. Cagle is not awake.  She does not open her eyes sp
ontaneously, keeps her mouth open.  She does not follow commands, does not interact with examiner.  T
he patient had a shallow presacral ulceration, stage 2.

 

PAST MEDICAL HISTORY:  Prior CVA, severe neurological impairment, hypertension, hypothyroidism, atria
l fibrillation.

 

PAST SURGICAL HISTORY:  Hysterectomy.

 

CURRENT MEDICATIONS:  Include Tylenol, DuoNeb, Creon, Eliquis, cefepime, Multaq, Zofran, bicarbonate,
 and vancomycin.

 

ALLERGY HISTORY:  CIPROFLOXACIN and CLINDAMYCIN.

 

PHYSICAL EXAMINATION:

VITAL SIGNS:  Current vital signs:  T-max 99.8, blood pressure 130/60, pulse 72, respirations 18 to 2
1, and O2 sat 95%.

SKIN:  Shows the stage 2 area of pressure damage in the presacral region.  Peripheral IV access and a
 Mandel catheter.  Urine output 1500, balance has been positive for the first few days, anywhere from 
200-1000 mL.  She has a gastrostomy tube.  The exit site appears normal.  HEENT:  She cannot open her
 eyes spontaneously.  The sclerae appears white.  Conjunctivae appeared normal.  Neck stiff to all di
rections.  She keeps her mouth open and dry oral mucosa, still few teeth in place with the expected g
um resorption and decay.

LUNGS:  With few crackles at the bases, particularly on the left side.

HEART:  S1, S2, with regular rate.  No obvious murmurs.

ABDOMEN:  Soft, not distended.  Gastrostomy exit site appears okay.  No bladder distention.

EXTREMITIES:  She has contractures in upper and lower extremities.  Does not move extremities spontan
eously except for the shaking, resting tremor in the upper extremities.

 

LABORATORY DATA:  White cell count 6.4, hemoglobin 10.4, platelets 300, normal differential.  Sodium 
133, creatinine 0.59, AST 70, ALT 87, albumin 2.1.  Urinalysis showed 21-50 wbc's on arrival, now non
e.  All blood cultures have been negative thus far.  Chest x-ray from two days ago, left basilar opac
ity.

 

ASSESSMENT:

1.  Advanced neurological impairment associated with prior cerebrovascular accidents, parkinsonian fe
atures, resting tremor severe, severe contractures in the upper extremities.

2.  Oropharyngeal dysfunction, status post gastrostomy tube placement.

3.  Abnormal lung examination and chest x-ray.

4.  Mild temperature elevation.

 

DISCUSSION:  Differential diagnosis includes aspiration pneumonia versus thromboembolism.  Would cont
inue with cefepime for another 2 to 3 days and then discontinue it.  She continues to have fever, the
n we will have to work it up with a CT of chest with contrast to evaluate for above possibilities.  S
he does have some elevation of AST and ALT and the other area of concern would be the gallbladder.  A
n alternate approach would be palliative care and hospice admission or just palliative care.

## 2018-01-09 LAB — VANCOMYCIN TROUGH SERPL-MCNC: 31.2 UG/ML

## 2018-01-09 RX ADMIN — Medication SCH ML: at 23:13

## 2018-01-09 RX ADMIN — Medication SCH ML: at 12:28

## 2018-01-09 RX ADMIN — VANCOMYCIN HYDROCHLORIDE SCH MLS: 750 INJECTION, POWDER, LYOPHILIZED, FOR SOLUTION INTRAVENOUS at 12:28

## 2018-01-09 RX ADMIN — CEFEPIME HYDROCHLORIDE SCH MLS: 1 INJECTION, POWDER, FOR SOLUTION INTRAMUSCULAR; INTRAVENOUS at 13:28

## 2018-01-09 RX ADMIN — VANCOMYCIN HYDROCHLORIDE SCH MLS: 750 INJECTION, POWDER, LYOPHILIZED, FOR SOLUTION INTRAVENOUS at 01:38

## 2018-01-09 RX ADMIN — CEFEPIME HYDROCHLORIDE SCH MLS: 1 INJECTION, POWDER, FOR SOLUTION INTRAMUSCULAR; INTRAVENOUS at 03:32

## 2018-01-09 NOTE — PRG
DATE OF SERVICE:  01/09/2018

 

SUBJECTIVE:  Ms. Cagle is lying on her left lateral decubitus.  She keeps her eyes closed having i
ntermittent shallow breathings followed by deep breathing pattern suggestive of Cheyne-Hood.

 

PHYSICAL EXAMINATION:

VITAL SIGNS:  T-max 99.3, blood pressure 112/56, pulse 74, respirations 18, O2 sat 96%.

GENERAL:  Appears restful.  She has gastrostomy tube in the right IJ triple lumen.

LUNGS:  Bilateral symmetric breath sounds.  Few scattered rhonchi on right side.

HEART:  S1, S2, regular rate.

ABDOMEN:  Soft and not distended.

NEUROLOGIC:  Unchanged.

 

LABORATORY DATA:  White cell count 6.4, hemoglobin 10.4, platelets 300, 66% neutrophils, 22% lymphocy
amita, glucose 140, potassium 3.6, sodium 133, creatinine 0.59, AST 70, ALT is 87, albumin 2.1.

 

ASSESSMENT:  Advanced neurological impairment associated with prior cerebrovascular accidents with pa
rkinsonian features, severe contractures, oropharyngeal dysfunction managed with gastrostomy tube.  N
ormal lung exam and chest x-ray consistent with aspiration pneumonia.  Thromboembolism would be anoth
er concern.  Venogram did not show any deep vein thrombosis.

 

The patient is currently receiving cefepime and vancomycin to be continued for few more days and then
 discontinue.  Abnormal liver function tests are still present.  May need an abdominal ultrasound to 
evaluate gallbladder and biliary tract, although these findings are usually nonspecific and could be 
associated with rather different processes.

## 2018-01-09 NOTE — PDOC.PN
- Subjective


Encounter Start Date: 01/09/18


Encounter Start Time: 08:58


-: non-verbal


Subjective: no distress





- Objective


MAR Reviewed: Yes


Vital Signs & Weight: 


 Vital Signs (12 hours)











  Temp Pulse Resp BP BP Pulse Ox


 


 01/09/18 07:37  98.2 F  73  20  133/60   97


 


 01/09/18 04:12  98.9 F  72  20  137/63   94 L


 


 01/09/18 00:15  99.3 F  75  20   135/63  93 L








 Weight











Admit Weight                   101 lb 12.8 oz


 


Weight                         121 lb 1.6 oz














I&O: 


 











 01/08/18 01/09/18 01/10/18





 06:59 06:59 06:59


 


Intake Total 2581 2094 


 


Output Total 2400 1675 


 


Balance 181 419 











Result Diagrams: 


 01/08/18 04:49





 01/05/18 11:42





Phys Exam





- Physical Examination


Neck: no JVD


Respiratory: clear to auscultation bilateral


Cardiovascular: RRR, no significant murmur


Gastrointestinal: soft, positive bowel sounds


Musculoskeletal: edema present





Dx/Plan


(1) Dehydration


Code(s): E86.0 - DEHYDRATION   Status: Resolved   





(2) Hypernatremia


Code(s): E87.0 - HYPEROSMOLALITY AND HYPERNATREMIA   Status: Resolved   





(3) Hypokalemia


Code(s): E87.6 - HYPOKALEMIA   Status: Resolved   





(4) Alzheimer disease


Code(s): G30.9 - ALZHEIMER'S DISEASE, UNSPECIFIED   Status: Chronic   


Qualifiers: 


   Alzheimer's disease onset: unspecified onset 





(5) Dyslipidemia


Code(s): E78.5 - HYPERLIPIDEMIA, UNSPECIFIED   Status: Chronic   





(6) Hypertension


Code(s): I10 - ESSENTIAL (PRIMARY) HYPERTENSION   Status: Chronic   





(7) Hypothyroidism


Code(s): E03.9 - HYPOTHYROIDISM, UNSPECIFIED   Status: Chronic   


Qualifiers: 


   Hypothyroidism type: unspecified   Qualified Code(s): E03.9 - Hypothyroidism

, unspecified   





(8) Encephalopathy


Code(s): G93.40 - ENCEPHALOPATHY, UNSPECIFIED   Status: Acute   





(9) Pneumonia


Code(s): J18.9 - PNEUMONIA, UNSPECIFIED ORGANISM   Status: Acute   


Qualifiers: 


   Pneumonia type: due to unspecified organism   Laterality: unspecified 

laterality 





- Plan


cont current plan of care, plan discussed w/ family, continue antibiotics, 

respiratory therapy


cont cefipime, C&S negative


-: still unable to perform MRI


-: prognosis guarded





* .

## 2018-01-10 LAB
CREAT CL PREDICTED SERPL C-G-VRATE: 58 ML/MIN (ref 70–130)
HGB BLD-MCNC: 10.6 G/DL (ref 12–16)
PLATELET # BLD AUTO: 417 THOU/UL (ref 130–400)
VANCOMYCIN SERPL-MCNC: 19.7 UG/ML

## 2018-01-10 RX ADMIN — VANCOMYCIN HYDROCHLORIDE SCH MLS: 750 INJECTION, POWDER, LYOPHILIZED, FOR SOLUTION INTRAVENOUS at 09:19

## 2018-01-10 RX ADMIN — CEFEPIME HYDROCHLORIDE SCH MLS: 1 INJECTION, POWDER, FOR SOLUTION INTRAMUSCULAR; INTRAVENOUS at 14:40

## 2018-01-10 RX ADMIN — Medication SCH ML: at 09:20

## 2018-01-10 RX ADMIN — CEFEPIME HYDROCHLORIDE SCH MLS: 1 INJECTION, POWDER, FOR SOLUTION INTRAMUSCULAR; INTRAVENOUS at 02:35

## 2018-01-10 RX ADMIN — Medication SCH ML: at 22:33

## 2018-01-10 NOTE — PDOC.PN
- Subjective


Encounter Start Date: 01/10/18


Encounter Start Time: 08:35


Subjective: nonverbal





- Objective


MAR Reviewed: Yes


Vital Signs & Weight: 


 Vital Signs (12 hours)











  Temp Pulse Resp BP BP Pulse Ox


 


 01/10/18 07:19  97.3 F L  75  20  138/63   95


 


 01/10/18 04:00  98.9 F  65  20   147/70 H  96


 


 01/09/18 23:00  99.6 F  72  20   149/68 H  93 L








 Weight











Admit Weight                   101 lb 12.8 oz


 


Weight                         120 lb 12.8 oz














I&O: 


 











 01/09/18 01/10/18 01/11/18





 06:59 06:59 06:59


 


Intake Total 2094 2506 


 


Output Total 1675 1400 


 


Balance 419 1106 











Result Diagrams: 


 01/10/18 05:07





 01/10/18 05:07





Phys Exam





- Physical Examination


Constitutional: NAD


Neck: no JVD


Respiratory: clear to auscultation bilateral


Cardiovascular: RRR, no significant murmur


Gastrointestinal: soft, positive bowel sounds


Musculoskeletal: no edema





Dx/Plan


(1) Dehydration


Code(s): E86.0 - DEHYDRATION   Status: Resolved   





(2) Hypernatremia


Code(s): E87.0 - HYPEROSMOLALITY AND HYPERNATREMIA   Status: Resolved   





(3) Hypokalemia


Code(s): E87.6 - HYPOKALEMIA   Status: Resolved   





(4) Alzheimer disease


Code(s): G30.9 - ALZHEIMER'S DISEASE, UNSPECIFIED   Status: Chronic   


Qualifiers: 


   Alzheimer's disease onset: unspecified onset 





(5) Dyslipidemia


Code(s): E78.5 - HYPERLIPIDEMIA, UNSPECIFIED   Status: Chronic   





(6) Hypertension


Code(s): I10 - ESSENTIAL (PRIMARY) HYPERTENSION   Status: Chronic   





(7) Hypothyroidism


Code(s): E03.9 - HYPOTHYROIDISM, UNSPECIFIED   Status: Chronic   


Qualifiers: 


   Hypothyroidism type: unspecified   Qualified Code(s): E03.9 - Hypothyroidism

, unspecified   





(8) Encephalopathy


Code(s): G93.40 - ENCEPHALOPATHY, UNSPECIFIED   Status: Acute   





(9) Pneumonia


Code(s): J18.9 - PNEUMONIA, UNSPECIFIED ORGANISM   Status: Acute   


Qualifiers: 


   Pneumonia type: due to unspecified organism   Laterality: unspecified 

laterality 





- Plan


MRI brain pending


-: appreciate ID input- abd US


-: cont iv antibx


-: cont multaq, eliquis





* .

## 2018-01-10 NOTE — ULT
ABDOMINAL ULTRASOUND:

 

01/10/2018

 

HISTORY:

Abnormal liver function tests.

 

COMPARISON:

None.

 

TECHNIQUE:

Multiplanar Gray-scale sonographic imaging of the abdomen obtained.

 

FINDINGS:

The imaged pancreas is unremarkable.  The tail is obscured by bowel gas.  The imaged IVC and aorta ap
pear within normal limits.

 

Incidental note is made of an incompletely imaged right pleural effusion.  No focal liver lesion or i
ntrahepatic biliary dilatation is seen.

 

The right kidney measures 8.2 cm in craniocaudal dimension and demonstrates no stone, hydronephrosis,
 or mass lesion.

 

There is no gallbladder wall thickening or pericholecystic fluid.  CBD measures 4 mm, within normal l
imits.  No discrete gallstone is noted.

 

The left kidney measures approximately 9 cm in craniocaudal dimension.  Detailed assessment of the le
ft kidney is limited secondary to body habitus and bowel gas.  No evidence for left-sided hydronephro
sis.  The spleen measures in the 7.3 cm range, within normal limits.

 

IMPRESSION:

Grossly unremarkable abdominal ultrasound, limited by body habitus.

 

POS: JENNIFER

## 2018-01-10 NOTE — MRI
NONCONTRAST BRAIN MRI:

 

CLINICAL HISTORY: 

Encephalopathy, altered mental status.

 

FINDINGS: 

Severe chronic microvascular ischemic disease is present.  There is encephalomalacia of the right occ
ipital lobe within a PCA distribution.  Stippled increased signal intensity on DWI series at the medi
al left frontal lobe is present.  There is parenchymal volume loss.  Compensatory dilatation of the v
entricular system.  Prominent bilateral mastoid effusions are present.  There are punctate foci of si
gnal alteration of the right cerebral hemisphere compatible with remote lacunar infarctions.  There i
s absence of the native intraarticular lenses.  

 

IMPRESSION: 

Multifocal punctate recent infarction of the left anterior communicating artery distribution superimp
osed upon severe chronic ischemic disease and areas of remote infarction.  Additional details are lani
cribed above.

 

POS: JENNIFER

## 2018-01-11 RX ADMIN — VANCOMYCIN HYDROCHLORIDE SCH MLS: 750 INJECTION, POWDER, LYOPHILIZED, FOR SOLUTION INTRAVENOUS at 12:13

## 2018-01-11 RX ADMIN — CEFEPIME HYDROCHLORIDE SCH MLS: 1 INJECTION, POWDER, FOR SOLUTION INTRAMUSCULAR; INTRAVENOUS at 03:18

## 2018-01-11 RX ADMIN — CEFEPIME HYDROCHLORIDE SCH MLS: 1 INJECTION, POWDER, FOR SOLUTION INTRAMUSCULAR; INTRAVENOUS at 15:18

## 2018-01-11 RX ADMIN — Medication SCH ML: at 23:20

## 2018-01-11 RX ADMIN — Medication SCH ML: at 12:14

## 2018-01-11 NOTE — PDOC.PN
- Subjective


Encounter Start Date: 01/11/18


Encounter Start Time: 08:04


Subjective: nonresponsive





- Objective


MAR Reviewed: Yes


Vital Signs & Weight: 


 Vital Signs (12 hours)











  Temp Pulse Resp BP Pulse Ox


 


 01/11/18 04:00  98.5 F  68  20  152/67 H  95


 


 01/10/18 22:15  99.3 F  72  24 H  153/67 H  95








 Weight











Admit Weight                   101 lb 12.8 oz


 


Weight                         120 lb 12.8 oz














I&O: 


 











 01/10/18 01/11/18 01/12/18





 06:59 06:59 06:59


 


Intake Total 2506 1467 


 


Output Total 1400 500 


 


Balance 1106 967 











Result Diagrams: 


 01/10/18 05:07





 01/10/18 05:07





Phys Exam





- Physical Examination


Constitutional: NAD


Neck: no JVD


Respiratory: clear to auscultation bilateral


Cardiovascular: RRR, no significant murmur


Gastrointestinal: soft, non-tender, positive bowel sounds


Musculoskeletal: edema present





Dx/Plan


(1) Dehydration


Code(s): E86.0 - DEHYDRATION   Status: Resolved   





(2) Hypernatremia


Code(s): E87.0 - HYPEROSMOLALITY AND HYPERNATREMIA   Status: Resolved   





(3) Hypokalemia


Code(s): E87.6 - HYPOKALEMIA   Status: Resolved   





(4) Alzheimer disease


Code(s): G30.9 - ALZHEIMER'S DISEASE, UNSPECIFIED   Status: Chronic   


Qualifiers: 


   Alzheimer's disease onset: unspecified onset 





(5) Dyslipidemia


Code(s): E78.5 - HYPERLIPIDEMIA, UNSPECIFIED   Status: Chronic   





(6) Hypertension


Code(s): I10 - ESSENTIAL (PRIMARY) HYPERTENSION   Status: Chronic   





(7) Hypothyroidism


Code(s): E03.9 - HYPOTHYROIDISM, UNSPECIFIED   Status: Chronic   


Qualifiers: 


   Hypothyroidism type: unspecified   Qualified Code(s): E03.9 - Hypothyroidism

, unspecified   





(8) Encephalopathy


Code(s): G93.40 - ENCEPHALOPATHY, UNSPECIFIED   Status: Acute   





(9) Pneumonia


Code(s): J18.9 - PNEUMONIA, UNSPECIFIED ORGANISM   Status: Acute   


Qualifiers: 


   Pneumonia type: due to unspecified organism   Laterality: unspecified 

laterality 





- Plan


cont antibx, transition to po/pt


-: cont nutrition


-: no evidence for improved CNS fcn, will discuss with family





* .

## 2018-01-12 VITALS — DIASTOLIC BLOOD PRESSURE: 73 MMHG | SYSTOLIC BLOOD PRESSURE: 142 MMHG | TEMPERATURE: 97.8 F

## 2018-01-12 LAB
CREAT CL PREDICTED SERPL C-G-VRATE: 61 ML/MIN (ref 70–130)
HGB BLD-MCNC: 11.1 G/DL (ref 12–16)
PLATELET # BLD AUTO: 488 THOU/UL (ref 130–400)
VANCOMYCIN TROUGH SERPL-MCNC: 13.7 UG/ML

## 2018-01-12 RX ADMIN — VANCOMYCIN HYDROCHLORIDE SCH: 750 INJECTION, POWDER, LYOPHILIZED, FOR SOLUTION INTRAVENOUS at 09:53

## 2018-01-12 RX ADMIN — Medication SCH ML: at 09:09

## 2018-01-12 RX ADMIN — CEFEPIME HYDROCHLORIDE SCH MLS: 1 INJECTION, POWDER, FOR SOLUTION INTRAMUSCULAR; INTRAVENOUS at 02:51

## 2018-01-12 NOTE — PDOC.PN
- Subjective


Encounter Start Date: 01/12/18


Encounter Start Time: 08:29


-: non-verbal


Subjective: no overnite events





- Objective


MAR Reviewed: Yes


Vital Signs & Weight: 


 Vital Signs (12 hours)











  Temp Pulse Resp BP BP Pulse Ox


 


 01/12/18 07:40  98.7 F  75  18    97


 


 01/12/18 07:39  98.7 F  75  18  105/52 L   97


 


 01/12/18 04:00  99.3 F  69  20   92/54 L  97


 


 01/11/18 20:30  97.6 F  73  20   151/69 H  99








 Weight











Admit Weight                   101 lb 12.8 oz


 


Weight                         119 lb 14.4 oz














I&O: 


 











 01/11/18 01/12/18 01/13/18





 06:59 06:59 06:59


 


Intake Total 2508 2153 


 


Output Total 1300 1550 


 


Balance 1208 603 











Result Diagrams: 


 01/12/18 05:12





 01/12/18 05:12





Phys Exam





- Physical Examination


Neck: no JVD


Respiratory: clear to auscultation bilateral


Cardiovascular: no significant murmur, irregular


Gastrointestinal: soft, positive bowel sounds


PEG


Musculoskeletal: edema present





Dx/Plan


(1) Dehydration


Code(s): E86.0 - DEHYDRATION   Status: Resolved   





(2) Hypernatremia


Code(s): E87.0 - HYPEROSMOLALITY AND HYPERNATREMIA   Status: Resolved   





(3) Hypokalemia


Code(s): E87.6 - HYPOKALEMIA   Status: Resolved   





(4) Alzheimer disease


Code(s): G30.9 - ALZHEIMER'S DISEASE, UNSPECIFIED   Status: Chronic   


Qualifiers: 


   Alzheimer's disease onset: unspecified onset 





(5) Dyslipidemia


Code(s): E78.5 - HYPERLIPIDEMIA, UNSPECIFIED   Status: Chronic   





(6) Hypertension


Code(s): I10 - ESSENTIAL (PRIMARY) HYPERTENSION   Status: Chronic   





(7) Hypothyroidism


Code(s): E03.9 - HYPOTHYROIDISM, UNSPECIFIED   Status: Chronic   


Qualifiers: 


   Hypothyroidism type: unspecified   Qualified Code(s): E03.9 - Hypothyroidism

, unspecified   





(8) Encephalopathy


Code(s): G93.40 - ENCEPHALOPATHY, UNSPECIFIED   Status: Acute   





(9) Pneumonia


Code(s): J18.9 - PNEUMONIA, UNSPECIFIED ORGANISM   Status: Acute   


Qualifiers: 


   Pneumonia type: due to unspecified organism   Laterality: unspecified 

laterality 





- Plan


transition to omnicef per PEG


-: cont meds, nutrition per PEG


-: needs long term care





* .

## 2018-01-12 NOTE — DIS
TRANSFER OF CARE NOTE

 

PRIMARY CARE PROVIDER:  Dr. Leonides Harrison

 

DATE OF ADMISSION:  12/24/2017

 

DATE OF DISCHARGE: 01/12/2018

 

DISCHARGE DISPOSITION:  Discharged to [facility].

 

FINAL DIAGNOSES:  

1.  Encephalopathy.  

2.  Pneumonia.

3.  Alzheimer disease.

4.  Dehydration.

5.  Hypernatremia.

6.  Hypokalemia. 

7.  Anxiety and depression.

8.  Dyslipidemia. 

9.  Hypertension.

10.  Hypothyroidism.

11.  Physical deconditioning. 

12.  Atrial fibrillation with rapid ventricular response.

13.  Recurrent cerebrovascular accident.

14.  Acute kidney failure with resolution.

15.  Non-ST-segment elevation myocardial infarction. 

 

DISCHARGE MEDICATIONS:  Eliquis 2.5 mg twice a day, Lipitor 10 mg a day, Omnicef 600 mg a day, Multaq
 400 mg twice a day, pancrelipase DR 12,000.  p.r.n., acetaminophen 650 mg q.i.d. p.r.n., vitamin D3,
 B12 per tube daily.

 

ALLERGIES:  CIPRO, CLINDAMYCIN.

 

PENDING AT THE TIME OF DISCHARGE:  Nothing.

 

CODE STATUS:  DNR, confirmed today.

 

HOSPITAL COURSE:  The patient was admitted to [facility] on 12/24/2017 with altered mental s
tatus.  She had a history of CVA, hypertension, hypothyroidism, and atrial fibrillation.  After evalu
ation white count was mildly elevated at 12.3, hemoglobin 14.9, platelet count 103,000.  INR was 1.8.
  The patient on chronic anticoagulation.  Sodium 156, potassium 3, BUN 16, creatinine 1.56.  Lactic 
acid 3.1, troponin I 5.7.  TSH 1.27.  A foul urine.  CT of the head was stable.  Chest x-ray; no acut
e intracranial process.  The patient was started on broad spectrum antibiotics, IV fluids with D5W, C
ardizem drip.

 

CONSULTATIONS  DURING HOSPITAL STAY:  Dr. Leonel Barrios, Cardiology; Dr. Sher Bailey, Pulmonology;  JERONIMO Tejada,  Gastroenterology, Genesis Sorneson, Neurology; Isai Gutierrez, Infectious Disease.  

 

Dr. Barrios agreed to maintaining her on Multaq and IV Cardizem at that time.  Because she was unabl
e to take her p.o. Eliquis she was started on Lovenox 1 mg/kg twice a day, IV Cardizem.  Infectious p
rofile, urine, no growth.  Blood cultures x3, no growth.  Influenza screen negative.  Dr. Bailey's cons
ultation included she had a metabolic encephalopathy.  At that time she was in IMCU.  On 12/28/2017, 
Dr. John Reinoso placed a PEG, at that point medicines and nutrition started being given per PE
G tube. 

 

LABORATORY:   Laboratory during her hospital stay; initial white count was 12.3.  Initial hemoglobin 
was 14.9 with IV fluids, her hemoglobin came down to the 10-12 range and remained there stable.  Whit
e count remained stable during her hospital stay.  On Eliquis her INR remained 1.8-1.7.  Initially, h
er sodium was 156, potassium 3.0, BUN 69, creatinine 1.56.  During her hospital stay this was followe
d.  The sodium came down to normal, potassium eventually normalized, BUN and creatinine eventually no
rmalized.  

 

Her troponins on 12/24/2017 were 5.7, follow up 6.8, follow up 7.3.  The patient was on Eliquis.  

 

The patient failed to improve clinically, remained nonresponsive, nonverbal.  Initially brain CT show
ed no acute abnormality.  She did have evidence of an old occipital infarct and an EEG done in 01/02/
2018 revealed an abnormal EEG with nonspecific cerebral dysfunction, no evidence of seizures.  Eventu
ally we were able to do an MRI which demonstrated multifocal punctate recent infarction in left anter
ior communicating artery distribution upon severe chronic ischemic disease and areas of remote infarc
tion.  

 

The patient currently with vital signs; temperature 98.7, pulse 75, respirations 18, O2 sat 97 on obi
m air, blood pressure 105/52.  Cardiorespiratory exam is normal.  The patient is unresponsive.  The s
ituation has been discussed in detail with the family.  There is no significant hope for recovery of 
CNS function.  She is being transferred to [facility] for long-term care.  Follow up w
ill be per the medical director at that facility.

 

Forty minutes spent preparing this discharge.